# Patient Record
Sex: MALE | Race: WHITE | Employment: FULL TIME | URBAN - METROPOLITAN AREA
[De-identification: names, ages, dates, MRNs, and addresses within clinical notes are randomized per-mention and may not be internally consistent; named-entity substitution may affect disease eponyms.]

---

## 2017-01-29 ENCOUNTER — HOSPITAL ENCOUNTER (OUTPATIENT)
Dept: MRI IMAGING | Age: 47
Discharge: HOME | End: 2017-01-29
Attending: ORTHOPAEDIC SURGERY
Payer: COMMERCIAL

## 2017-01-29 DIAGNOSIS — M23.305: ICD-10-CM

## 2017-01-29 PROCEDURE — 73721 MRI JNT OF LWR EXTRE W/O DYE: CPT

## 2017-07-31 ENCOUNTER — OFFICE VISIT (OUTPATIENT)
Dept: INTERNAL MEDICINE CLINIC | Age: 47
End: 2017-07-31

## 2017-07-31 VITALS
OXYGEN SATURATION: 98 % | DIASTOLIC BLOOD PRESSURE: 69 MMHG | WEIGHT: 213 LBS | RESPIRATION RATE: 16 BRPM | HEART RATE: 63 BPM | SYSTOLIC BLOOD PRESSURE: 105 MMHG | TEMPERATURE: 97.8 F | BODY MASS INDEX: 30.49 KG/M2 | HEIGHT: 70 IN

## 2017-07-31 DIAGNOSIS — M25.561 RIGHT KNEE PAIN, UNSPECIFIED CHRONICITY: ICD-10-CM

## 2017-07-31 DIAGNOSIS — G50.0 SUPRAORBITAL NEURALGIA: ICD-10-CM

## 2017-07-31 DIAGNOSIS — F43.20 ADJUSTMENT DISORDER, UNSPECIFIED TYPE: ICD-10-CM

## 2017-07-31 DIAGNOSIS — R20.0 NUMBNESS OF FINGER: ICD-10-CM

## 2017-07-31 DIAGNOSIS — G47.00 INSOMNIA, UNSPECIFIED TYPE: ICD-10-CM

## 2017-07-31 DIAGNOSIS — Z76.89 ENCOUNTER TO ESTABLISH CARE WITH NEW DOCTOR: Primary | ICD-10-CM

## 2017-07-31 DIAGNOSIS — I10 ESSENTIAL HYPERTENSION: ICD-10-CM

## 2017-07-31 RX ORDER — MELOXICAM 15 MG/1
15 TABLET ORAL DAILY
Qty: 30 TAB | Refills: 5 | Status: SHIPPED | OUTPATIENT
Start: 2017-07-31 | End: 2017-11-27 | Stop reason: SDUPTHER

## 2017-07-31 RX ORDER — AMITRIPTYLINE HYDROCHLORIDE 25 MG/1
25 TABLET, FILM COATED ORAL
Qty: 30 TAB | Refills: 2 | Status: SHIPPED | OUTPATIENT
Start: 2017-07-31 | End: 2017-11-27 | Stop reason: ALTCHOICE

## 2017-07-31 RX ORDER — LISINOPRIL 5 MG/1
5 TABLET ORAL DAILY
Qty: 30 TAB | Refills: 3 | Status: SHIPPED | OUTPATIENT
Start: 2017-07-31 | End: 2017-11-27 | Stop reason: SDUPTHER

## 2017-07-31 RX ORDER — CLONAZEPAM 0.5 MG/1
0.5 TABLET ORAL
Qty: 15 TAB | Refills: 0 | Status: SHIPPED | OUTPATIENT
Start: 2017-07-31 | End: 2017-11-27 | Stop reason: SDUPTHER

## 2017-07-31 NOTE — PROGRESS NOTES
1. Have you been to the ER, urgent care clinic since your last visit? Hospitalized since your last visit? No    2. Have you seen or consulted any other health care providers outside of the [facility] since your last visit? Include any pap smears or colon screening.   Pt uses [facility].

## 2017-07-31 NOTE — MR AVS SNAPSHOT
Visit Information Date & Time Provider Department Dept. Phone Encounter #  
 7/31/2017 11:15 AM Leonor Wright MD [facility] [id number] Follow-up Instructions Return in about 1 month (around 8/31/2017). Your Appointments 8/28/2017  1:30 PM  
New Patient with Leighann Longoria MD  
[facility] [address] [place] [address] Appt Note: np est pcp c/p  
 Uriah Banerjee [address] [address]  
[phone]  
  
   
 [address] [facility] [address] Upcoming Health Maintenance Date Due DTaP/Tdap/Td series (1 - Tdap) 9/10/1990 INFLUENZA AGE 9 TO ADULT 8/1/2017 Allergies as of 7/31/2017  Review Complete On: 7/31/2017 By: Leonor Wright MD  
  
 Severity Noted Reaction Type Reactions Omnicef [Cefdinir]  02/19/2014    Hives Current Immunizations  Never Reviewed No immunizations on file. Not reviewed this visit You Were Diagnosed With   
  
 Codes Comments Encounter to establish care with new doctor    -  Primary ICD-10-CM: Z71.89 ICD-9-CM: V65.8 Adjustment disorder, unspecified type     ICD-10-CM: F43.20 ICD-9-CM: 309.9 Supraorbital neuralgia     ICD-10-CM: G52.9 ICD-9-CM: 352.9 Essential hypertension     ICD-10-CM: I10 
ICD-9-CM: 401.9 Insomnia, unspecified type     ICD-10-CM: G47.00 ICD-9-CM: 780.52 Numbness of finger     ICD-10-CM: R20.0 ICD-9-CM: 782.0 Right knee pain, unspecified chronicity     ICD-10-CM: M25.561 ICD-9-CM: 719.46 Vitals BP Pulse Temp Resp Height(growth percentile) Weight(growth percentile) 105/69 (BP 1 Location: Left arm, BP Patient Position: Sitting) 63 97.8 °F (36.6 °C) (Oral) 16 5' 10\" (1.778 m) 213 lb (96.6 kg) SpO2 BMI Smoking Status 98% 30.56 kg/m2 Never Smoker Vitals History BMI and BSA Data Body Mass Index Body Surface Area  30.56 kg/m 2 2.18 m 2  
  
  
 Preferred Pharmacy Pharmacy Name Phone [facility] Amadeo Par[facility] [place] [facility] Closs [phone] Your Updated Medication List  
  
   
This list is accurate as of: 7/31/17 11:35 AM.  Always use your most recent med list.  
  
  
  
  
 amitriptyline 25 mg tablet Commonly known as:  ELAVIL Take 1 Tab by mouth nightly. clonazePAM 0.5 mg tablet Commonly known as:  Raydell Pavel Take 1 Tab by mouth nightly as needed. Max Daily Amount: 0.5 mg.  
  
 lisinopril 5 mg tablet Commonly known as:  Sam Human Take 1 Tab by mouth daily. meloxicam 15 mg tablet Commonly known as:  MOBIC Take 1 Tab by mouth daily. Prescriptions Printed Refills  
 clonazePAM (KLONOPIN) 0.5 mg tablet 0 Sig: Take 1 Tab by mouth nightly as needed. Max Daily Amount: 0.5 mg.  
 Class: Print Route: Oral  
  
Prescriptions Sent to Pharmacy Refills  
 amitriptyline (ELAVIL) 25 mg tablet 2 Sig: Take 1 Tab by mouth nightly. Class: Normal  
 Pharm[facility] [address] [facility]: [phone] Route: Oral  
 lisinopril (PRINIVIL, ZESTRIL) 5 mg tablet 3 Sig: Take 1 Tab by mouth daily. Class: Normal  
 Pharmacy: [facility] [address] [place] [facility] Ph #: [phone] Route: Oral  
 meloxicam (MOBIC) 15 mg tablet 5 Sig: Take 1 Tab by mouth daily. Class: Normal  
 Pharmacy: [place] [address] Ph #: [phone] Route: Oral  
  
We Performed the Following CBC WITH AUTOMATED DIFF [31427 CPT(R)] LIPID PANEL [53624 CPT(R)] METABOLIC PANEL, COMPREHENSIVE [79906 CPT(R)] Follow-up Instructions Return in about 1 month (around 8/31/2017). Patient Instructions High Blood Pressure: Care Instructions Your Care Instructions If your blood pressure is usually above 140/90, you have high blood pressure, or hypertension. That means the top number is 140 or higher or the bottom number is 90 or higher, or both. Despite what a lot of people think, high blood pressure usually doesn't cause headaches or make you feel dizzy or lightheaded. It usually has no symptoms. But it does increase your risk for heart attack, stroke, and kidney or eye damage. The higher your blood pressure, the more your risk increases. Your doctor will give you a goal for your blood pressure. Your goal will be based on your health and your age. An example of a goal is to keep your blood pressure below 140/90. Lifestyle changes, such as eating healthy and being active, are always important to help lower blood pressure. You might also take medicine to reach your blood pressure goal. 
Follow-up care is a key part of your treatment and safety. Be sure to make and go to all appointments, and call your doctor if you are having problems. It's also a good idea to know your test results and keep a list of the medicines you take. How can you care for yourself at home? Medical treatment · If you stop taking your medicine, your blood pressure will go back up. You may take one or more types of medicine to lower your blood pressure. Be safe with medicines. Take your medicine exactly as prescribed. Call your doctor if you think you are having a problem with your medicine. · Talk to your doctor before you start taking aspirin every day. Aspirin can help certain people lower their risk of a heart attack or stroke. But taking aspirin isn't right for everyone, because it can cause serious bleeding. · See your doctor regularly. You may need to see the doctor more often at first or until your blood pressure comes down. · If you are taking blood pressure medicine, talk to your doctor before you take decongestants or anti-inflammatory medicine, such as ibuprofen. Some of these medicines can raise blood pressure. · Learn how to check your blood pressure at home. Lifestyle changes · Stay at a healthy weight. This is especially important if you put on weight around the waist. Losing even 10 pounds can help you lower your blood pressure. · If your doctor recommends it, get more exercise. Walking is a good choice. Bit by bit, increase the amount you walk every day. Try for at least 30 minutes on most days of the week. You also may want to swim, bike, or do other activities. · Avoid or limit alcohol. Talk to your doctor about whether you can drink any alcohol. · Try to limit how much sodium you eat to less than 2,300 milligrams (mg) a day. Your doctor may ask you to try to eat less than 1,500 mg a day. · Eat plenty of fruits (such as bananas and oranges), vegetables, legumes, whole grains, and low-fat dairy products. · Lower the amount of saturated fat in your diet. Saturated fat is found in animal products such as milk, cheese, and meat. Limiting these foods may help you lose weight and also lower your risk for heart disease. · Do not smoke. Smoking increases your risk for heart attack and stroke. If you need help quitting, talk to your doctor about stop-smoking programs and medicines. These can increase your chances of quitting for good. When should you call for help? Call 911 anytime you think you may need emergency care. This may mean having symptoms that suggest that your blood pressure is causing a serious heart or blood vessel problem. Your blood pressure may be over 180/110. For example, call 911 if: 
· You have symptoms of a heart attack. These may include: ¨ Chest pain or pressure, or a strange feeling in the chest. 
¨ Sweating. ¨ Shortness of breath. ¨ Nausea or vomiting. ¨ Pain, pressure, or a strange feeling in the back, neck, jaw, or upper belly or in one or both shoulders or arms. ¨ Lightheadedness or sudden weakness. ¨ A fast or irregular heartbeat. · You have symptoms of a stroke. These may include: 
¨ Sudden numbness, tingling, weakness, or loss of movement in your face, arm, or leg, especially on only one side of your body. ¨ Sudden vision changes. ¨ Sudden trouble speaking. ¨ Sudden confusion or trouble understanding simple statements. ¨ Sudden problems with walking or balance. ¨ A sudden, severe headache that is different from past headaches. · You have severe back or belly pain. Do not wait until your blood pressure comes down on its own. Get help right away. Call your doctor now or seek immediate care if: 
· Your blood pressure is much higher than normal (such as 180/110 or higher), but you don't have symptoms. · You think high blood pressure is causing symptoms, such as: ¨ Severe headache. ¨ Blurry vision. Watch closely for changes in your health, and be sure to contact your doctor if: 
· Your blood pressure measures 140/90 or higher at least 2 times. That means the top number is 140 or higher or the bottom number is 90 or higher, or both. · You think you may be having side effects from your blood pressure medicine. · Your blood pressure is usually normal, but it goes above normal at least 2 times. Where can you learn more? Go to http://jennifer-annabelle.info/. Enter Q479 in the search box to learn more about \"High Blood Pressure: Care Instructions. \" Current as of: August 8, 2016 Content Version: 11.3 © 5825-2385 [employer]. Care instructions adapted under license by [employer] (which disclaims liability or warranty for this information). If you have questions about a medical condition or this instruction, always ask your healthcare professional. [address] any warranty or liability for your use of this information. Introducing [facility]!    
 Darby Morris introduces [employer] patient portal. Now you can access parts of your medical record, email your doctor's office, and request medication refills online. 1. In your internet browser, go to [email or web address] 2. Click on the First Time User? Click Here link in the Sign In box. You will see the New Member Sign Up page. 3. Enter your Smarty Ring Access Code exactly as it appears below. You will not need to use this code after youve completed the sign-up process. If you do not sign up before the expiration date, you must request a new code. · Smart[employer] Ring Access Code: [email or web address] Expires: 10/29/2017 11:35 AM 
 
4. Enter the last four digits of your Social Security Number (xxxx) and Date of Birth (mm/dd/yyyy) as indicated and click Submit. You will be taken to the next sign-up page. 5. Create a Smarty Ring ID. This will be your Smarty Ring login ID and cannot be changed, so think of one that is secure and easy to remember. 6. Create a Smart[employer] Ring password. You can change your password at any time. 7. Enter your Password Reset Question and Answer. This can be used at a later time if you forget your password. 8. Enter your e-mail address. You will receive e-mail notification when new information is available in [address] 9. Click Sign Up. You can now view and download portions of your medical record. 10. Click the Download Summary menu link to download a portable copy of your medical information. If you have questions, please visit the Frequently Asked Questions section of the Smart[employer] Ring website. Remember, Smart[employer] Ring is NOT to be used for urgent needs. For medical emergencies, dial 911. Now available from your iPhone and Android! Please provide this summary of care documentation to your next provider. Your primary care clinician is listed as [address] If you have any questions after today's visit, please call [phone].

## 2017-07-31 NOTE — PATIENT INSTRUCTIONS

## 2017-07-31 NOTE — PROGRESS NOTES
Cesar Liang is a 51 y.o. male who presents today for Establish Care and Numbness (left little finger. No known trauma.)  . He has a history of   Patient Active Problem List   Diagnosis Code    Neck pain M54.2    Supraorbital neuralgia G52.9   . Today patient is here to establish care. [facility]. he is switching because establish care. Records are not available for me to review. he does have other concerns. His children go to [facility]    Mother with pancreatic cancer. Seeing Dr. Sergei Luna. Just was diagnosed over the last several weeks. Father with dementia and taking a lot of time. Has had a short temper. \"blowing up easily\". Having very high stress. Not sleeping very well and has not recently. Stress with recent move. Pt notes breaking down and crying last night. Good support system with wife and family. Brothers helping with parents. Work is stable. History of CP: in the past. Seeing Dr. Carmen Perez (cards) with [facility]. EKG normal. No stress. HTN: on two meds. Weight overall down. Pt notes some fatigue and wonders if this could be medication related. Not currently checking BP regularly. Supraorbital Neuralgia: In the past. No recent issues. Social:  for website. Not exercising as much as previously. No tobacco, EtOH social.     Family: HTN: parents. Mother: pancreatic cancer    Father: Borderline DM; Dementia. ROS  Review of Systems   Constitutional: Positive for malaise/fatigue. Negative for chills, fever and weight loss. HENT: Negative for congestion and sore throat. Eyes: Negative for blurred vision, double vision, photophobia and pain. Respiratory: Negative for cough and shortness of breath. Cardiovascular: Positive for chest pain (Had several in the past, none currently). Negative for palpitations, orthopnea, claudication and leg swelling.    Gastrointestinal: Negative for abdominal pain, constipation, diarrhea, heartburn, nausea and vomiting. Genitourinary: Negative for dysuria, frequency and urgency. Musculoskeletal: Positive for joint pain (R knee with use. ). Negative for myalgias. Skin: Negative for rash. Neurological: Negative. Negative for headaches. Endo/Heme/Allergies: Does not bruise/bleed easily. Psychiatric/Behavioral: Negative for depression, memory loss, substance abuse and suicidal ideas. The patient has insomnia. Very high stress levels       Visit Vitals    /69 (BP 1 Location: Left arm, BP Patient Position: Sitting)    Pulse 63    Temp 97.8 °F (36.6 °C) (Oral)    Resp 16    Ht 5' 10\" (1.778 m)    Wt 213 lb (96.6 kg)    SpO2 98%    BMI 30.56 kg/m2       Physical Exam   Constitutional: He is oriented to person, place, and time and well-developed, well-nourished, and in no distress. No distress. HENT:   Head: Normocephalic and atraumatic. Mouth/Throat: No oropharyngeal exudate. Eyes: Conjunctivae are normal. No scleral icterus. Neck: Normal range of motion. No JVD present. No thyromegaly present. Cardiovascular: Normal rate and regular rhythm. Exam reveals no gallop and no friction rub. No murmur heard. Pulmonary/Chest: Effort normal and breath sounds normal. No respiratory distress. He has no wheezes. Musculoskeletal: Normal range of motion. He exhibits no edema. Neurological: He is alert and oriented to person, place, and time. No cranial nerve deficit. Skin: Skin is dry. No rash noted. Psychiatric: Memory, affect and judgment normal.   Tearful at times. Appropriate for situation       Current Outpatient Prescriptions   Medication Sig    amitriptyline (ELAVIL) 25 mg tablet Take 1 Tab by mouth nightly.  clonazePAM (KLONOPIN) 0.5 mg tablet Take 1 Tab by mouth nightly as needed. Max Daily Amount: 0.5 mg.    lisinopril (PRINIVIL, ZESTRIL) 5 mg tablet Take 1 Tab by mouth daily.  meloxicam (MOBIC) 15 mg tablet Take 1 Tab by mouth daily. No current facility-administered medications for this visit. Past Medical History:   Diagnosis Date    Hypertension       Past Surgical History:   Procedure Laterality Date    HX MENISCUS REPAIR Left 2014    HX OTHER SURGICAL  2011    Rectal becker    HX TONSILLECTOMY  1975    HX WISDOM TEETH EXTRACTION  1988      Social History   Substance Use Topics    Smoking status: Never Smoker    Smokeless tobacco: Never Used    Alcohol use Yes      Comment: rare      Family History   Problem Relation Age of Onset    Heart Disease Mother     Cancer Mother     Heart Disease Father     Hypertension Father     Dementia Father     Cancer Maternal Grandfather         Allergies   Allergen Reactions    Omnicef [Cefdinir] Hives        Assessment/Plan  Diagnoses and all orders for this visit:    1. Encounter to establish care with new doctor - No significant other history. 2. Adjustment disorder, unspecified type - Acute stress. Sleep is big issue. Add nightly TCA to help with this. Discussed physical activity to help with stress. PRN benzo for acute stress. Not for long term use. No red flag symptoms. -     clonazePAM (KLONOPIN) 0.5 mg tablet; Take 1 Tab by mouth nightly as needed. Max Daily Amount: 0.5 mg.    3. Supraorbital neuralgia - previous issue    4. Essential hypertension - over controlled. Stop diuretic. Pt to monitor. Could titrate up ACEi. Screen for other modifyable risks. -     METABOLIC PANEL, COMPREHENSIVE  -     LIPID PANEL  -     CBC WITH AUTOMATED DIFF  -     lisinopril (PRINIVIL, ZESTRIL) 5 mg tablet; Take 1 Tab by mouth daily. 5. Insomnia, unspecified type  -     amitriptyline (ELAVIL) 25 mg tablet; Take 1 Tab by mouth nightly. -     clonazePAM (KLONOPIN) 0.5 mg tablet; Take 1 Tab by mouth nightly as needed. Max Daily Amount: 0.5 mg.    6. Numbness of finger - pinky. Likely entrapment. Monitor.      7. Right knee pain, unspecified chronicity  -     meloxicam (MOBIC) 15 mg tablet; Take 1 Tab by mouth daily. Follow-up Disposition:  Return in about 1 month (around 8/31/2017).     Sharan Roth MD  7/31/2017

## 2017-08-01 LAB
ALBUMIN SERPL-MCNC: 4.8 G/DL (ref 3.5–5.5)
ALBUMIN/GLOB SERPL: 2.3 {RATIO} (ref 1.2–2.2)
ALP SERPL-CCNC: 44 IU/L (ref 39–117)
ALT SERPL-CCNC: 22 IU/L (ref 0–44)
AST SERPL-CCNC: 20 IU/L (ref 0–40)
BASOPHILS # BLD AUTO: 0 X10E3/UL (ref 0–0.2)
BASOPHILS NFR BLD AUTO: 0 %
BILIRUB SERPL-MCNC: 0.6 MG/DL (ref 0–1.2)
BUN SERPL-MCNC: 14 MG/DL (ref 6–24)
BUN/CREAT SERPL: 13 (ref 9–20)
CALCIUM SERPL-MCNC: 10 MG/DL (ref 8.7–10.2)
CHLORIDE SERPL-SCNC: 100 MMOL/L (ref 96–106)
CHOLEST SERPL-MCNC: 205 MG/DL (ref 100–199)
CO2 SERPL-SCNC: 26 MMOL/L (ref 18–29)
CREAT SERPL-MCNC: 1.06 MG/DL (ref 0.76–1.27)
EOSINOPHIL # BLD AUTO: 0.1 X10E3/UL (ref 0–0.4)
EOSINOPHIL NFR BLD AUTO: 2 %
ERYTHROCYTE [DISTWIDTH] IN BLOOD BY AUTOMATED COUNT: 13.1 % (ref 12.3–15.4)
GLOBULIN SER CALC-MCNC: 2.1 G/DL (ref 1.5–4.5)
GLUCOSE SERPL-MCNC: 92 MG/DL (ref 65–99)
HCT VFR BLD AUTO: 50 % (ref 37.5–51)
HDLC SERPL-MCNC: 49 MG/DL
HGB BLD-MCNC: 17.4 G/DL (ref 12.6–17.7)
IMM GRANULOCYTES # BLD: 0 X10E3/UL (ref 0–0.1)
IMM GRANULOCYTES NFR BLD: 0 %
INTERPRETATION, 910389: NORMAL
LDLC SERPL CALC-MCNC: 133 MG/DL (ref 0–99)
LYMPHOCYTES # BLD AUTO: 1.6 X10E3/UL (ref 0.7–3.1)
LYMPHOCYTES NFR BLD AUTO: 32 %
MCH RBC QN AUTO: 33.7 PG (ref 26.6–33)
MCHC RBC AUTO-ENTMCNC: 34.8 G/DL (ref 31.5–35.7)
MCV RBC AUTO: 97 FL (ref 79–97)
MONOCYTES # BLD AUTO: 0.3 X10E3/UL (ref 0.1–0.9)
MONOCYTES NFR BLD AUTO: 7 %
NEUTROPHILS # BLD AUTO: 3 X10E3/UL (ref 1.4–7)
NEUTROPHILS NFR BLD AUTO: 59 %
PLATELET # BLD AUTO: 219 X10E3/UL (ref 150–379)
POTASSIUM SERPL-SCNC: 4.4 MMOL/L (ref 3.5–5.2)
PROT SERPL-MCNC: 6.9 G/DL (ref 6–8.5)
RBC # BLD AUTO: 5.16 X10E6/UL (ref 4.14–5.8)
SODIUM SERPL-SCNC: 139 MMOL/L (ref 134–144)
TRIGL SERPL-MCNC: 115 MG/DL (ref 0–149)
VLDLC SERPL CALC-MCNC: 23 MG/DL (ref 5–40)
WBC # BLD AUTO: 5.1 X10E3/UL (ref 3.4–10.8)

## 2017-11-27 ENCOUNTER — OFFICE VISIT (OUTPATIENT)
Dept: INTERNAL MEDICINE CLINIC | Age: 47
End: 2017-11-27

## 2017-11-27 VITALS
TEMPERATURE: 97.9 F | OXYGEN SATURATION: 96 % | SYSTOLIC BLOOD PRESSURE: 121 MMHG | HEIGHT: 70 IN | HEART RATE: 65 BPM | WEIGHT: 214 LBS | BODY MASS INDEX: 30.64 KG/M2 | DIASTOLIC BLOOD PRESSURE: 74 MMHG | RESPIRATION RATE: 16 BRPM

## 2017-11-27 DIAGNOSIS — G47.00 INSOMNIA, UNSPECIFIED TYPE: ICD-10-CM

## 2017-11-27 DIAGNOSIS — Z00.00 WELLNESS EXAMINATION: Primary | ICD-10-CM

## 2017-11-27 DIAGNOSIS — F43.20 ADJUSTMENT DISORDER, UNSPECIFIED TYPE: ICD-10-CM

## 2017-11-27 DIAGNOSIS — M25.561 RIGHT KNEE PAIN, UNSPECIFIED CHRONICITY: ICD-10-CM

## 2017-11-27 DIAGNOSIS — I10 ESSENTIAL HYPERTENSION: ICD-10-CM

## 2017-11-27 RX ORDER — MELATONIN 5 MG
5 CAPSULE ORAL
COMMUNITY
End: 2018-03-07 | Stop reason: ALTCHOICE

## 2017-11-27 RX ORDER — CLONAZEPAM 0.5 MG/1
0.5 TABLET ORAL
Qty: 15 TAB | Refills: 1 | Status: SHIPPED | OUTPATIENT
Start: 2017-11-27 | End: 2019-02-06

## 2017-11-27 RX ORDER — MELOXICAM 15 MG/1
15 TABLET ORAL DAILY
Qty: 30 TAB | Refills: 5 | Status: SHIPPED | OUTPATIENT
Start: 2017-11-27 | End: 2019-02-06 | Stop reason: SDUPTHER

## 2017-11-27 RX ORDER — LISINOPRIL 5 MG/1
5 TABLET ORAL DAILY
Qty: 30 TAB | Refills: 11 | Status: SHIPPED | OUTPATIENT
Start: 2017-11-27 | End: 2018-03-07 | Stop reason: SDUPTHER

## 2017-11-27 RX ORDER — ESCITALOPRAM OXALATE 5 MG/1
5 TABLET ORAL DAILY
Qty: 30 TAB | Refills: 5 | Status: SHIPPED | OUTPATIENT
Start: 2017-11-27 | End: 2018-03-07 | Stop reason: SDUPTHER

## 2017-11-27 RX ORDER — CLONAZEPAM 0.5 MG/1
0.5 TABLET ORAL
Qty: 15 TAB | Refills: 1 | Status: SHIPPED | OUTPATIENT
Start: 2017-11-27 | End: 2017-11-27 | Stop reason: SDUPTHER

## 2017-11-27 NOTE — PROGRESS NOTES
Sahil Euceda is a 49 y.o. male who presents today for Complete Physical and Depression  . He has a history of   Patient Active Problem List   Diagnosis Code    Neck pain M54.2    Supraorbital neuralgia G52.9   . Today patient is here for CPE. he does have other concerns. Stress/Loss of family Member: Notes that overall he is doing alright. Notes that his father has dementia and not doing well. Pt's father still living alone and dementia has worsened with his depression over loosing his wife. Mr. Dangelo Borges notes that he has been recently having more issues as this is the holidays and his mother really enjoyed this. Crying spells. Unable to sleep. No anger spells. Did have grogginess with TCA. Having to help father a lot due to his dementia. Hypertension - Only taking ACEi. Stopped BB at last visit. Hypertension ROS: taking medications as instructed, no medication side effects noted, no TIA's, no chest pain on exertion, no dyspnea on exertion, no swelling of ankles     reports that he has never smoked. He has never used smokeless tobacco.    reports that he drinks alcohol. BP Readings from Last 2 Encounters:   11/27/17 121/74   07/31/17 105/69       Health maintenance hx includes:  Exercise: moderately active. Form of exercise: running on occasions, riding his bike more. Diet: generally follows a low fat low cholesterol diet, nonsmoker, alcohol intake socially  Social:  for website. At home with wife and children. Cancer screening:    Colon cancer screening:  Last Colonoscopy: N/A. Prostate cancer screening: PSA and/or GABRIEL: N/A, father with recent issues with prostate. Immunizations: There is no immunization history on file for this patient. Immunization status: UTD. ROS  Review of Systems   Constitutional: Negative for chills, fever and weight loss. Eyes: Negative for blurred vision, double vision, photophobia and pain.    Respiratory: Negative for cough, hemoptysis, sputum production and shortness of breath. Cardiovascular: Negative for chest pain, palpitations, orthopnea, claudication and leg swelling. Gastrointestinal: Negative for abdominal pain, constipation, diarrhea, heartburn, nausea and vomiting. Genitourinary: Negative for dysuria, frequency, hematuria and urgency. Musculoskeletal: Positive for joint pain. Skin: Negative for itching and rash. Neurological: Negative. Endo/Heme/Allergies: Does not bruise/bleed easily. Psychiatric/Behavioral: Positive for depression. Negative for hallucinations, memory loss, substance abuse and suicidal ideas. The patient has insomnia. The patient is not nervous/anxious. Visit Vitals    /74 (BP 1 Location: Left arm, BP Patient Position: Sitting)    Pulse 65    Temp 97.9 °F (36.6 °C) (Oral)    Resp 16    Ht 5' 10\" (1.778 m)    Wt 214 lb (97.1 kg)    SpO2 96%    BMI 30.71 kg/m2       Physical Exam   Constitutional: He is oriented to person, place, and time. He appears well-developed and well-nourished. HENT:   Head: Normocephalic and atraumatic. Eyes: EOM are normal. Pupils are equal, round, and reactive to light. Neck: Normal range of motion. Neck supple. No JVD present. Cardiovascular: Normal rate and regular rhythm. No murmur heard. Pulmonary/Chest: Effort normal and breath sounds normal. No respiratory distress. He has no wheezes. Abdominal: Soft. Bowel sounds are normal. He exhibits no distension. There is no tenderness. Musculoskeletal: Normal range of motion. He exhibits no edema. Neurological: He is alert and oriented to person, place, and time. No cranial nerve deficit. Skin: Skin is warm and dry. He is not diaphoretic. Psychiatric: His behavior is normal.   Low mood. Tearful at times. Current Outpatient Prescriptions   Medication Sig    melatonin 5 mg cap capsule Take 5 mg by mouth nightly.     escitalopram oxalate (LEXAPRO) 5 mg tablet Take 1 Tab by mouth daily.  lisinopril (PRINIVIL, ZESTRIL) 5 mg tablet Take 1 Tab by mouth daily.  meloxicam (MOBIC) 15 mg tablet Take 1 Tab by mouth daily.  clonazePAM (KLONOPIN) 0.5 mg tablet Take 1 Tab by mouth nightly as needed. Max Daily Amount: 0.5 mg. No current facility-administered medications for this visit. Past Medical History:   Diagnosis Date    Hypertension       Past Surgical History:   Procedure Laterality Date    HX MENISCUS REPAIR Left 2014    HX OTHER SURGICAL  2011    Rectal becker    HX TONSILLECTOMY  1975    HX WISDOM TEETH EXTRACTION  1988      Social History   Substance Use Topics    Smoking status: Never Smoker    Smokeless tobacco: Never Used    Alcohol use Yes      Comment: rare      Family History   Problem Relation Age of Onset    Heart Disease Mother     Cancer Mother     Heart Disease Father     Hypertension Father     Dementia Father     Cancer Maternal Grandfather         Allergies   Allergen Reactions    Omnicef [Cefdinir] Hives        Assessment/Plan  Diagnoses and all orders for this visit:    1. Wellness examination -  UTD. Pt to continue to increase physical activity. Mignon Yancey was counseled on age-appropriate/ guideline-based risk prevention behaviors and screening for a 49y.o. year old   male . We also discussed adjustments in screening based on family history if necessary. Printed instructions for preventative screening guidelines and healthy behaviors given to patient with after visit summary. 2. Adjustment disorder, unspecified type - given continued symptoms, start low dose SSRI. PRN benzo, but discussed not for long term use. -     escitalopram oxalate (LEXAPRO) 5 mg tablet; Take 1 Tab by mouth daily. -     clonazePAM (KLONOPIN) 0.5 mg tablet; Take 1 Tab by mouth nightly as needed. Max Daily Amount: 0.5 mg.    3. Insomnia, unspecified type  -     clonazePAM (KLONOPIN) 0.5 mg tablet;  Take 1 Tab by mouth nightly as needed. Max Daily Amount: 0.5 mg.    4. Essential hypertension - much better. Only on low dose ACEi. -     lisinopril (PRINIVIL, ZESTRIL) 5 mg tablet; Take 1 Tab by mouth daily. 5. Right knee pain, unspecified chronicity - PRN use.   -     meloxicam (MOBIC) 15 mg tablet; Take 1 Tab by mouth daily. Follow-up Disposition:  Return in about 3 months (around 2/27/2018).     Kelli Cervantes MD  11/27/2017

## 2017-11-27 NOTE — PATIENT INSTRUCTIONS
Escitalopram (By mouth)   Escitalopram ([id number])  Treats depression and generalized anxiety disorder ([place]). Brand Name(s): Lexapro   There may be other brand names for this medicine. When This Medicine Should Not Be Used: This medicine is not right for everyone. Do not use it if you had an allergic reaction to escitalopram or citalopram.  How to Use This Medicine:   Liquid, Tablet  · Take this medicine as directed. You may need to take it for a month or more before you feel better. Your dose may need to be changed to find out what works best for you. · Measure the oral liquid medicine with a marked measuring spoon, oral syringe, or medicine cup. · This medicine should come with a Medication Guide. Ask your pharmacist for a copy if you do not have one. · Missed dose: Take a dose as soon as you remember. If it is almost time for your next dose, wait until then and take a regular dose. Do not take extra medicine to make up for a missed dose. · Store the medicine in a closed container at room temperature, away from heat, moisture, and direct light. Drugs and Foods to Avoid:   Ask your doctor or pharmacist before using any other medicine, including over-the-counter medicines, vitamins, and herbal products. · Do not use this medicine together with pimozide. Do not use this medicine and an MAO inhibitor (MAOI) within 14 days of each other. · Some medicines can affect how escitalopram works. Tell your doctor if you are using the following:   ¨ Buspirone, carbamazepine, fentanyl, lithium, Ricardo's wort, tramadol, or tryptophan supplements  ¨ Amphetamines  ¨ Blood thinner (including warfarin)  ¨ Diuretic (water pill)  ¨ NSAID pain or arthritis medicine (including aspirin, celecoxib, diclofenac, ibuprofen, naproxen)  ¨ Triptan medicine to treat migraine headaches (including sumatriptan)  · Tell your doctor if you use anything else that makes you sleepy.  Some examples are allergy medicine, narcotic pain medicine, and alcohol. · Do not drink alcohol while you are using this medicine. Warnings While Using This Medicine:   · Tell your doctor if you are pregnant or breastfeeding, or if you have kidney disease, liver disease, bleeding problems, glaucoma, heart disease, or a seizure disorder. · For some children, teenagers, and young adults, this medicine may increase mental or emotional problems. This may lead to thoughts of suicide and violence. Talk with your doctor right away if you have any thoughts or behavior changes that concern you. Tell your doctor if you or anyone in your family has a history of bipolar disorder or suicide attempts. · This medicine may cause the following problems:   ¨ Serotonin syndrome (more likely when taken with certain medicines)  ¨ Low sodium levels  ¨ Increased risk of bleeding problems  · This medicine may make you dizzy or drowsy. Do not drive or do anything that could be dangerous until you know how this medicine affects you. · Your doctor may want to monitor your child's weight and height, because this medicine may cause decreased appetite and weight loss in children. · Do not stop using this medicine suddenly. Your doctor will need to slowly decrease your dose before you stop it completely. · Your doctor will check your progress and the effects of this medicine at regular visits. Keep all appointments. · Keep all medicine out of the reach of children. Never share your medicine with anyone.   Possible Side Effects While Using This Medicine:   Call your doctor right away if you notice any of these side effects:  · Allergic reaction: Itching or hives, swelling in your face or hands, swelling or tingling in your mouth or throat, chest tightness, trouble breathing  · Anxiety, restlessness, fever, sweating, muscle spasms, nausea, vomiting, diarrhea, seeing or hearing things that are not there  · Confusion, weakness, and muscle twitching  · Eye pain, vision changes, seeing halos around lights  · Fast, pounding, or uneven heartbeat  · Feeling more excited or energetic than usual, racing thoughts, trouble sleeping  · Seizures  · Thoughts of hurting yourself or others, unusual behavior  · Unusual bleeding or bruising  If you notice these less serious side effects, talk with your doctor:   · Dizziness, drowsiness, or sleepiness  · Dry mouth  · Headache  · Nausea, constipation, diarrhea  · Sexual problems  If you notice other side effects that you think are caused by this medicine, tell your doctor. Call your doctor for medical advice about side effects. You may report side effects to FDA at 4-005-JDK-8506  © 2017 [facility] Information is for End User's use only and may not be sold, redistributed or otherwise used for commercial purposes. The above information is an  only. It is not intended as medical advice for individual conditions or treatments. Talk to your doctor, nurse or pharmacist before following any medical regimen to see if it is safe and effective for you. Well Visit, Ages 25 to 48: Care Instructions  Your Care Instructions    Physical exams can help you stay healthy. Your doctor has checked your overall health and may have suggested ways to take good care of yourself. He or she also may have recommended tests. At home, you can help prevent illness with healthy eating, regular exercise, and other steps. Follow-up care is a key part of your treatment and safety. Be sure to make and go to all appointments, and call your doctor if you are having problems. It's also a good idea to know your test results and keep a list of the medicines you take. How can you care for yourself at home? · Reach and stay at a healthy weight. This will lower your risk for many problems, such as obesity, diabetes, heart disease, and high blood pressure. · Get at least 30 minutes of physical activity on most days of the week. Walking is a good choice.  You also may want to do other activities, such as running, swimming, cycling, or playing tennis or team sports. Discuss any changes in your exercise program with your doctor. · Do not smoke or allow others to smoke around you. If you need help quitting, talk to your doctor about stop-smoking programs and medicines. These can increase your chances of quitting for good. · Talk to your doctor about whether you have any risk factors for sexually transmitted infections (STIs). Having one sex partner (who does not have STIs and does not have sex with anyone else) is a good way to avoid these infections. · Use birth control if you do not want to have children at this time. Talk with your doctor about the choices available and what might be best for you. · Protect your skin from too much sun. When you're outdoors from 10 a.m. to 4 p.m., stay in the shade or cover up with clothing and a hat with a wide brim. Wear sunglasses that block UV rays. Even when it's cloudy, put broad-spectrum sunscreen (SPF 30 or higher) on any exposed skin. · See a dentist one or two times a year for checkups and to have your teeth cleaned. · Wear a seat belt in the car. · Drink alcohol in moderation, if at all. That means no more than 2 drinks a day for men and 1 drink a day for women. Follow your doctor's advice about when to have certain tests. These tests can spot problems early. For everyone  · Cholesterol. Have the fat (cholesterol) in your blood tested after age 21. Your doctor will tell you how often to have this done based on your age, family history, or other things that can increase your risk for heart disease. · Blood pressure. Have your blood pressure checked during a routine doctor visit. Your doctor will tell you how often to check your blood pressure based on your age, your blood pressure results, and other factors. · Vision. Talk with your doctor about how often to have a glaucoma test.  · Diabetes.  Ask your doctor whether you should have tests for diabetes. · Colon cancer. Have a test for colon cancer at age 48. You may have one of several tests. If you are younger than 48, you may need a test earlier if you have any risk factors. Risk factors include whether you already had a precancerous polyp removed from your colon or whether your parent, brother, sister, or child has had colon cancer. For women  · Breast exam and mammogram. Talk to your doctor about when you should have a clinical breast exam and a mammogram. Medical experts differ on whether and how often women under 50 should have these tests. Your doctor can help you decide what is right for you. · Pap test and pelvic exam. Begin Pap tests at age 24. A Pap test is the best way to find cervical cancer. The test often is part of a pelvic exam. Ask how often to have this test.  · Tests for sexually transmitted infections (STIs). Ask whether you should have tests for STIs. You may be at risk if you have sex with more than one person, especially if your partners do not wear condoms. For men  · Tests for sexually transmitted infections (STIs). Ask whether you should have tests for STIs. You may be at risk if you have sex with more than one person, especially if you do not wear a condom. · Testicular cancer exam. Ask your doctor whether you should check your testicles regularly. · Prostate exam. Talk to your doctor about whether you should have a blood test (called a PSA test) for prostate cancer. Experts differ on whether and when men should have this test. Some experts suggest it if you are older than 39 and are -American or have a father or brother who got prostate cancer when he was younger than 72. When should you call for help? Watch closely for changes in your health, and be sure to contact your doctor if you have any problems or symptoms that concern you. Where can you learn more? Go to http://jennifer-annabelle.info/.   Enter P072 in the search box to learn more about \"Well Visit, Ages 25 to 48: Care Instructions. \"  Current as of: May 12, 2017  Content Version: 11.4  © 9800-2391 [employer]. Care instructions adapted under license by [employer] (which disclaims liability or warranty for this information). If you have questions about a medical condition or this instruction, always ask your healthcare professional. [place] [address] any warranty or liability for your use of this information.

## 2017-11-27 NOTE — MR AVS SNAPSHOT
Visit Information Date & Time Provider Department Dept. Phone Encounter #  
 11/27/2017  1:30 PM Chon Carmichael MD [facility] [address] [id number] Upcoming Health Maintenance Date Due DTaP/Tdap/Td series (2 - Td) 11/19/2023 Allergies as of 11/27/2017  Review Complete On: 99/99/0123 By: Oanh Ferrer Severity Noted Reaction Type Reactions Omnicef [Cefdinir]  02/19/2014    Hives Current Immunizations  Never Reviewed No immunizations on file. Not reviewed this visit You Were Diagnosed With   
  
 Codes Comments Wellness examination    -  Primary ICD-10-CM: Z00.00 ICD-9-CM: V70.0 Adjustment disorder, unspecified type     ICD-10-CM: F43.20 ICD-9-CM: 309.9 Insomnia, unspecified type     ICD-10-CM: G47.00 ICD-9-CM: 780.52 Essential hypertension     ICD-10-CM: I10 
ICD-9-CM: 401.9 Right knee pain, unspecified chronicity     ICD-10-CM: M25.561 ICD-9-CM: 719.46 Vitals BP Pulse Temp Resp Height(growth percentile) Weight(growth percentile) 121/74 (BP 1 Location: Left arm, BP Patient Position: Sitting) 65 97.9 °F (36.6 °C) (Oral) 16 5' 10\" (1.778 m) 214 lb (97.1 kg) SpO2 BMI Smoking Status 96% 30.71 kg/m2 Never Smoker Vitals History BMI and BSA Data Body Mass Index Body Surface Area 30.71 kg/m 2 2.19 m 2 Preferred Pharmacy Pharmacy Name Phone [facility] [address] [place] [address] Lisa Tam [facility] [phone] Your Updated Medication List  
  
   
This list is accurate as of: 11/27/17  2:38 PM.  Always use your most recent med list.  
  
  
  
  
 clonazePAM 0.5 mg tablet Commonly known as:  Weesatche Boop Take 1 Tab by mouth nightly as needed. Max Daily Amount: 0.5 mg.  
  
 escitalopram oxalate 5 mg tablet Commonly known as:  Eugena Lindy Take 1 Tab by mouth daily. lisinopril 5 mg tablet Commonly known as:  Donnald Code  
 Take 1 Tab by mouth daily. melatonin 5 mg Cap capsule Take 5 mg by mouth nightly. meloxicam 15 mg tablet Commonly known as:  MOBIC Take 1 Tab by mouth daily. Prescriptions Printed Refills  
 clonazePAM (KLONOPIN) 0.5 mg tablet 1 Sig: Take 1 Tab by mouth nightly as needed. Max Daily Amount: 0.5 mg.  
 Class: Print Route: Oral  
  
Prescriptions Sent to Pharmacy Refills  
 escitalopram oxalate (LEXAPRO) 5 mg tablet 5 Sig: Take 1 Tab by mouth daily. Class: Normal  
 Pharmacy: [place] [address] Ph #: [phone] Route: Oral  
 lisinopril (PRINIVIL, ZESTRIL) 5 mg tablet 11 Sig: Take 1 Tab by mouth daily. Class: Normal  
 Pharmacy: [place] [address] Ph #: [phone] Route: Oral  
 meloxicam (MOBIC) 15 mg tablet 5 Sig: Take 1 Tab by mouth daily. Class: Normal  
 [place] [address] [place] [address] Ph #: [phone] Route: Oral  
  
Patient Instructions Escitalopram (By mouth) Escitalopram ([id number]-mzd-JMU-oh-pram) Treats depression and generalized anxiety disorder (DENNIS). Brand Name(s): Lexapro There may be other brand names for this medicine. When This Medicine Should Not Be Used: This medicine is not right for everyone. Do not use it if you had an allergic reaction to escitalopram or citalopram. 
How to Use This Medicine:  
Liquid, Tablet · Take this medicine as directed. You may need to take it for a month or more before you feel better. Your dose may need to be changed to find out what works best for you. · Measure the oral liquid medicine with a marked measuring spoon, oral syringe, or medicine cup. · This medicine should come with a Medication Guide. Ask your pharmacist for a copy if you do not have one. · Missed dose: Take a dose as soon as you remember.  If it is almost time for your next dose, wait until then and take a regular dose. Do not take extra medicine to make up for a missed dose. · Store the medicine in a closed container at room temperature, away from heat, moisture, and direct light. Drugs and Foods to Avoid: Ask your doctor or pharmacist before using any other medicine, including over-the-counter medicines, vitamins, and herbal products. · Do not use this medicine together with pimozide. Do not use this medicine and an MAO inhibitor (MAOI) within 14 days of each other. · Some medicines can affect how escitalopram works. Tell your doctor if you are using the following:  
¨ Buspirone, carbamazepine, fentanyl, lithium, Ricardo's wort, tramadol, or tryptophan supplements ¨ Amphetamines ¨ Blood thinner (including warfarin) ¨ Diuretic (water pill) ¨ NSAID pain or arthritis medicine (including aspirin, celecoxib, diclofenac, ibuprofen, naproxen) ¨ Triptan medicine to treat migraine headaches (including sumatriptan) · Tell your doctor if you use anything else that makes you sleepy. Some examples are allergy medicine, narcotic pain medicine, and alcohol. · Do not drink alcohol while you are using this medicine. Warnings While Using This Medicine: · Tell your doctor if you are pregnant or breastfeeding, or if you have kidney disease, liver disease, bleeding problems, glaucoma, heart disease, or a seizure disorder. · For some children, teenagers, and young adults, this medicine may increase mental or emotional problems. This may lead to thoughts of suicide and violence. Talk with your doctor right away if you have any thoughts or behavior changes that concern you. Tell your doctor if you or anyone in your family has a history of bipolar disorder or suicide attempts. · This medicine may cause the following problems:  
¨ Serotonin syndrome (more likely when taken with certain medicines) ¨ Low sodium levels ¨ Increased risk of bleeding problems · This medicine may make you dizzy or drowsy. Do not drive or do anything that could be dangerous until you know how this medicine affects you. · Your doctor may want to monitor your child's weight and height, because this medicine may cause decreased appetite and weight loss in children. · Do not stop using this medicine suddenly. Your doctor will need to slowly decrease your dose before you stop it completely. · Your doctor will check your progress and the effects of this medicine at regular visits. Keep all appointments. · Keep all medicine out of the reach of children. Never share your medicine with anyone. Possible Side Effects While Using This Medicine:  
Call your doctor right away if you notice any of these side effects: · Allergic reaction: Itching or hives, swelling in your face or hands, swelling or tingling in your mouth or throat, chest tightness, trouble breathing · Anxiety, restlessness, fever, sweating, muscle spasms, nausea, vomiting, diarrhea, seeing or hearing things that are not there · Confusion, weakness, and muscle twitching · Eye pain, vision changes, seeing halos around lights · Fast, pounding, or uneven heartbeat · Feeling more excited or energetic than usual, racing thoughts, trouble sleeping · Seizures · Thoughts of hurting yourself or others, unusual behavior · Unusual bleeding or bruising If you notice these less serious side effects, talk with your doctor: · Dizziness, drowsiness, or sleepiness · Dry mouth 
· Headache · Nausea, constipation, diarrhea · Sexual problems If you notice other side effects that you think are caused by this medicine, tell your doctor. Call your doctor for medical advice about side effects. You may report side effects to FDA at 6-518-FDA-1067 © 2017 [address] Information is for End User's use only and may not be sold, redistributed or otherwise used for commercial purposes. The above information is an  only. It is not intended as medical advice for individual conditions or treatments. Talk to your doctor, nurse or pharmacist before following any medical regimen to see if it is safe and effective for you. Well Visit, Ages 25 to 48: Care Instructions Your Care Instructions Physical exams can help you stay healthy. Your doctor has checked your overall health and may have suggested ways to take good care of yourself. He or she also may have recommended tests. At home, you can help prevent illness with healthy eating, regular exercise, and other steps. Follow-up care is a key part of your treatment and safety. Be sure to make and go to all appointments, and call your doctor if you are having problems. It's also a good idea to know your test results and keep a list of the medicines you take. How can you care for yourself at home? · Reach and stay at a healthy weight. This will lower your risk for many problems, such as obesity, diabetes, heart disease, and high blood pressure. · Get at least 30 minutes of physical activity on most days of the week. Walking is a good choice. You also may want to do other activities, such as running, swimming, cycling, or playing tennis or team sports. Discuss any changes in your exercise program with your doctor. · Do not smoke or allow others to smoke around you. If you need help quitting, talk to your doctor about stop-smoking programs and medicines. These can increase your chances of quitting for good. · Talk to your doctor about whether you have any risk factors for sexually transmitted infections (STIs). Having one sex partner (who does not have STIs and does not have sex with anyone else) is a good way to avoid these infections. · Use birth control if you do not want to have children at this time. Talk with your doctor about the choices available and what might be best for you. · Protect your skin from too much sun. When you're outdoors from 10 a.m. to 4 p.m., stay in the shade or cover up with clothing and a hat with a wide brim. Wear sunglasses that block UV rays. Even when it's cloudy, put broad-spectrum sunscreen (SPF 30 or higher) on any exposed skin. · See a dentist one or two times a year for checkups and to have your teeth cleaned. · Wear a seat belt in the car. · Drink alcohol in moderation, if at all. That means no more than 2 drinks a day for men and 1 drink a day for women. Follow your doctor's advice about when to have certain tests. These tests can spot problems early. For everyone · Cholesterol. Have the fat (cholesterol) in your blood tested after age 21. Your doctor will tell you how often to have this done based on your age, family history, or other things that can increase your risk for heart disease. · Blood pressure. Have your blood pressure checked during a routine doctor visit. Your doctor will tell you how often to check your blood pressure based on your age, your blood pressure results, and other factors. · Vision. Talk with your doctor about how often to have a glaucoma test. 
· Diabetes. Ask your doctor whether you should have tests for diabetes. · Colon cancer. Have a test for colon cancer at age 48. You may have one of several tests. If you are younger than 48, you may need a test earlier if you have any risk factors. Risk factors include whether you already had a precancerous polyp removed from your colon or whether your parent, brother, sister, or child has had colon cancer. For women · Breast exam and mammogram. Talk to your doctor about when you should have a clinical breast exam and a mammogram. Medical experts differ on whether and how often women under 50 should have these tests. Your doctor can help you decide what is right for you. · Pap test and pelvic exam. Begin Pap tests at age 24.  A Pap test is the best way to find cervical cancer. The test often is part of a pelvic exam. Ask how often to have this test. 
· Tests for sexually transmitted infections (STIs). Ask whether you should have tests for STIs. You may be at risk if you have sex with more than one person, especially if your partners do not wear condoms. For men · Tests for sexually transmitted infections (STIs). Ask whether you should have tests for STIs. You may be at risk if you have sex with more than one person, especially if you do not wear a condom. · Testicular cancer exam. Ask your doctor whether you should check your testicles regularly. · Prostate exam. Talk to your doctor about whether you should have a blood test (called a PSA test) for prostate cancer. Experts differ on whether and when men should have this test. Some experts suggest it if you are older than 39 and are -American or have a father or brother who got prostate cancer when he was younger than 72. When should you call for help? Watch closely for changes in your health, and be sure to contact your doctor if you have any problems or symptoms that concern you. Where can you learn more? Go to http://jennifer-annabelle.info/. Enter P072 in the search box to learn more about \"Well Visit, Ages 25 to 48: Care Instructions. \" Current as of: May 12, 2017 Content Version: 11.4 © 0511-7574 [employer]wise, [employer]orporated. Care instructions adapted under license by [employer] (which disclaims liability or warranty for this information). If you have questions about a medical condition or this instruction, always ask your healthcare professional. [address] any warranty or liability for your use of this information. Introducing [facility]! Matthew Mendes introduces [employer] patient portal. Now you can access parts of your medical record, email your doctor's office, and request medication refills online. 1. In your internet browser, go to [email or web address] 2. Click on the First Time User? Click Here link in the Sign In box. You will see the New Member Sign Up page. 3. Enter your [employer] Access Code exactly as it appears below. You will not need to use this code after youve completed the sign-up process. If you do not sign up before the expiration date, you must request a new code. · [employer] Access Code: [email or web address] Expires: 2/25/2018  2:38 PM 
 
4. Enter the last four digits of your Social Security Number (xxxx) and Date of Birth (mm/dd/yyyy) as indicated and click Submit. You will be taken to the next sign-up page. 5. Create a [employer] ID. This will be your [employer] login ID and cannot be changed, so think of one that is secure and easy to remember. 6. Create a [employer] password. You can change your password at any time. 7. Enter your Password Reset Question and Answer. This can be used at a later time if you forget your password. 8. Enter your e-mail address. You will receive e-mail notification when new information is available in [address] 9. Click Sign Up. You can now view and download portions of your medical record. 10. Click the Download Summary menu link to download a portable copy of your medical information. If you have questions, please visit the Frequently Asked Questions section of the [employer] website. Remember, [employer] is NOT to be used for urgent needs. For medical emergencies, dial 911. Now available from your iPhone and Android! Please provide this summary of care documentation to your next provider. Your primary care clinician is listed as Chu Solorio. If you have any questions after today's visit, please call [phone].

## 2018-03-07 ENCOUNTER — OFFICE VISIT (OUTPATIENT)
Dept: INTERNAL MEDICINE CLINIC | Age: 48
End: 2018-03-07

## 2018-03-07 VITALS
BODY MASS INDEX: 30.35 KG/M2 | SYSTOLIC BLOOD PRESSURE: 122 MMHG | TEMPERATURE: 98 F | HEIGHT: 70 IN | DIASTOLIC BLOOD PRESSURE: 77 MMHG | OXYGEN SATURATION: 95 % | WEIGHT: 212 LBS | HEART RATE: 67 BPM | RESPIRATION RATE: 16 BRPM

## 2018-03-07 DIAGNOSIS — F43.20 ADJUSTMENT DISORDER, UNSPECIFIED TYPE: Primary | ICD-10-CM

## 2018-03-07 DIAGNOSIS — I10 ESSENTIAL HYPERTENSION: ICD-10-CM

## 2018-03-07 RX ORDER — LISINOPRIL 5 MG/1
5 TABLET ORAL DAILY
Qty: 90 TAB | Refills: 3 | Status: SHIPPED | OUTPATIENT
Start: 2018-03-07 | End: 2019-03-28 | Stop reason: SDUPTHER

## 2018-03-07 RX ORDER — ESCITALOPRAM OXALATE 5 MG/1
5 TABLET ORAL DAILY
Qty: 90 TAB | Refills: 2 | Status: SHIPPED | OUTPATIENT
Start: 2018-03-07 | End: 2018-12-10 | Stop reason: SDUPTHER

## 2018-03-07 NOTE — PROGRESS NOTES
Jerica Willams is a 49 y.o. male who presents today for Medication Refill and Depression  . 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 Today patient is here for f/u. Stress/Loss of family Member: At last visit he had recently lost his mother. He noted that overall he was doing alright. Notes that his father has dementia and was not doing well. Pt's father still living alone and dementia has worsened with his depression over loosing his wife. We decided to start SSRI due to continued symptoms. Since he notes changes in his sadness. Improved symptoms. Notes one 'meltdown' with his mood, but overall improved quicker. His father seems to be getting better. Requiring less help. Sleep still not great, but not a big issue. Hypertension - stable just on low dose BB   Hypertension ROS: taking medications as instructed, no medication side effects noted, no TIA's, no chest pain on exertion, no dyspnea on exertion, no swelling of ankles     reports that he has never smoked. He has never used smokeless tobacco.    reports that he drinks alcohol. BP Readings from Last 2 Encounters:   03/07/18 122/77   11/27/17 121/74          ROS  Review of Systems   Constitutional: Negative for chills, fever and weight loss. HENT: Negative for congestion, ear discharge, ear pain, hearing loss, nosebleeds and tinnitus. Respiratory: Negative for cough, hemoptysis, sputum production and shortness of breath. Cardiovascular: Negative for chest pain, palpitations, orthopnea, claudication and leg swelling. Gastrointestinal: Negative for abdominal pain, constipation, diarrhea, heartburn, nausea and vomiting. Genitourinary: Negative for dysuria, frequency, hematuria and urgency. Musculoskeletal: Positive for back pain. Negative for joint pain, myalgias and neck pain. Skin: Negative for itching and rash. Neurological: Negative. Endo/Heme/Allergies: Does not bruise/bleed easily. Psychiatric/Behavioral: Positive for depression. Negative for hallucinations, memory loss, substance abuse and suicidal ideas. The patient is not nervous/anxious and does not have insomnia. Visit Vitals    /77 (BP 1 Location: Left arm, BP Patient Position: Sitting)    Pulse 67    Temp 98 °F (36.7 °C) (Oral)    Resp 16    Ht 5' 10\" (1.778 m)    Wt 212 lb (96.2 kg)    SpO2 95%    BMI 30.42 kg/m2       Physical Exam   Constitutional: He is oriented to person, place, and time. He appears well-developed and well-nourished. HENT:   Head: Normocephalic and atraumatic. Eyes: EOM are normal. Pupils are equal, round, and reactive to light. Cardiovascular: Normal rate and regular rhythm. No murmur heard. Pulmonary/Chest: Effort normal and breath sounds normal. No respiratory distress. Genitourinary:   Genitourinary Comments: Small cyst on scrotum, No s/s of infection. Musculoskeletal: Normal range of motion. He exhibits no edema. Neurological: He is alert and oriented to person, place, and time. Skin: Skin is warm and dry. He is not diaphoretic. Psychiatric: He has a normal mood and affect. His behavior is normal.         Current Outpatient Prescriptions   Medication Sig    escitalopram oxalate (LEXAPRO) 5 mg tablet Take 1 Tab by mouth daily.  lisinopril (PRINIVIL, ZESTRIL) 5 mg tablet Take 1 Tab by mouth daily.  meloxicam (MOBIC) 15 mg tablet Take 1 Tab by mouth daily.  clonazePAM (KLONOPIN) 0.5 mg tablet Take 1 Tab by mouth nightly as needed. Max Daily Amount: 0.5 mg. No current facility-administered medications for this visit.          Past Medical History:   Diagnosis Date    Hypertension       Past Surgical History:   Procedure Laterality Date    HX MENISCUS REPAIR Left 2014    HX OTHER SURGICAL  2011    Rectal becker    HX TONSILLECTOMY  1975    HX [address]  [address] Social History   Substance Use Topics    Smoking status: Never Smoker    Smokeless tobacco: Never Used    Alcohol use Yes      Comment: rare      Family History   Problem Relation Age of Onset    Heart Disease Mother     Cancer Mother     Heart Disease Father     Hypertension Father     Dementia Father     Cancer Maternal Grandfather         Allergies   Allergen Reactions    Omnicef [Cefdinir] Hives        Assessment/Plan  Diagnoses and all orders for this visit:    1. Adjustment disorder, unspecified type - initially some issues with making him emotion-less. Much better recently. Discussed staying on min 6-12 mos. Would continue current dose. Has not required benzo. Over 50% of a visit of 25 minutes spent reviewing diagnosis and treatment options and side effects of medicines. -     escitalopram oxalate (LEXAPRO) 5 mg tablet; Take 1 Tab by mouth daily. 2. Essential hypertension  -     lisinopril (PRINIVIL, ZESTRIL) 5 mg tablet; Take 1 Tab by mouth daily. Follow-up Disposition:  Return in about 6 months (around 9/7/2018).     Roger Platt MD  3/7/2018

## 2018-03-07 NOTE — MR AVS SNAPSHOT
[address] [facility] 
 
 
 [address] [place] [address] 
[phone] Patient: Michael Huynh MRN: [id number] [facility]:[birth year] Visit Information Date & Time Provider Department Dept. Phone Encounter #  
 3/7/2018  7:30 AM Anastasiya Sanford MD [facility] [address] [id number] Follow-up Instructions Return in about 6 months (around 9/7/2018). Upcoming Health Maintenance Date Due DTaP/Tdap/Td series (2 - Td) 11/19/2023 Allergies as of 3/7/2018  Review Complete On: 4/0/8690 By: Anil Ferrer Severity Noted Reaction Type Reactions Omnicef [Cefdinir]  02/19/2014    Hives Current Immunizations  Never Reviewed No immunizations on file. Not reviewed this visit You Were Diagnosed With   
  
 Codes Comments Adjustment disorder, unspecified type    -  Primary ICD-10-CM: F43.20 ICD-9-CM: 309.9 Essential hypertension     ICD-10-CM: I10 
ICD-9-CM: 401.9 Vitals BP Pulse Temp Resp Height(growth percentile) Weight(growth percentile) 122/77 (BP 1 Location: Left arm, BP Patient Position: Sitting) 67 98 °F (36.7 °C) (Oral) 16 5' 10\" (1.778 m) 212 lb (96.2 kg) SpO2 BMI Smoking Status 95% 30.42 kg/m2 Never Smoker Vitals History BMI and BSA Data Body Mass Index Body Surface Area  
 30.42 kg/m 2 2.18 m 2 Preferred Pharmacy Pharmacy Name Phone [facility] [address] [place] [address] Liane Josie Ojeda [phone] Your Updated Medication List  
  
   
This list is accurate as of 3/7/18  8:04 AM.  Always use your most recent med list.  
  
  
  
  
 clonazePAM 0.5 mg tablet Commonly known as:  Mundo Natarajan Take 1 Tab by mouth nightly as needed. Max Daily Amount: 0.5 mg.  
  
 escitalopram oxalate 5 mg tablet Commonly known as:  Shashank Velez Take 1 Tab by mouth daily. lisinopril 5 mg tablet Commonly known as:  Lety Hawkins  
 Take 1 Tab by mouth daily. meloxicam 15 mg tablet Commonly known as:  MOBIC Take 1 Tab by mouth daily. Prescriptions Sent to Pharmacy Refills  
 escitalopram oxalate (LEXAPRO) 5 mg tablet 2 Sig: Take 1 Tab by mouth daily. Class: Normal  
 Pharmacy: [address], [place] [address] Ph #: [phone] Route: Oral  
 lisinopril (PRINIVIL, ZESTRIL) 5 mg tablet 3 Sig: Take 1 Tab by mouth daily. Class: Normal  
 Pharmacy: [address], [place] [address] Ph #: [phone] Route: Oral  
  
Follow-up Instructions Return in about 6 months (around 9/7/2018). Introducing [facility]! [facility] introduces Shijiebang patient portal. Now you can access parts of your medical record, email your doctor's office, and request medication refills online. 1. In your internet browser, go to [email or web address] 2. Click on the First Time User? Click Here link in the Sign In box. You will see the New Member Sign Up page. 3. Enter your Shijiebang Access Code exactly as it appears below. You will not need to use this code after youve completed the sign-up process. If you do not sign up before the expiration date, you must request a new code. · Shijiebang Access Code: [email or web address] Expires: 6/5/2018  8:04 AM 
 
4. Enter the last four digits of your Social Security Number (xxxx) and Date of Birth (mm/dd/yyyy) as indicated and click Submit. You will be taken to the next sign-up page. 5. Create a Communication Specialist Limitedt ID. This will be your Shijiebang login ID and cannot be changed, so think of one that is secure and easy to remember. 6. Create a [employer] password. You can change your password at any time. 7. Enter your Password Reset Question and Answer. This can be used at a later time if you forget your password. 8. Enter your e-mail address.  You will receive e-mail notification when new information is available in SeerGate. 9. Click Sign Up. You can now view and download portions of your medical record. 10. Click the Download Summary menu link to download a portable copy of your medical information. If you have questions, please visit the Frequently Asked Questions section of the SeerGate website. Remember, SeerGate is NOT to be used for urgent needs. For medical emergencies, dial 911. Now available from your iPhone and Android! Please provide this summary of care documentation to your next provider. Your primary care clinician is listed as Casandra Toth. If you have any questions after today's visit, please call [phone].

## 2018-08-14 ENCOUNTER — OFFICE VISIT (OUTPATIENT)
Dept: INTERNAL MEDICINE CLINIC | Age: 48
End: 2018-08-14

## 2018-08-14 VITALS
SYSTOLIC BLOOD PRESSURE: 118 MMHG | HEART RATE: 67 BPM | TEMPERATURE: 98 F | RESPIRATION RATE: 16 BRPM | BODY MASS INDEX: 33.07 KG/M2 | OXYGEN SATURATION: 95 % | HEIGHT: 70 IN | DIASTOLIC BLOOD PRESSURE: 79 MMHG | WEIGHT: 231 LBS

## 2018-08-14 DIAGNOSIS — I10 ESSENTIAL HYPERTENSION: ICD-10-CM

## 2018-08-14 DIAGNOSIS — M54.2 NECK PAIN: Primary | ICD-10-CM

## 2018-08-14 RX ORDER — IBUPROFEN 200 MG
600 TABLET ORAL 2 TIMES DAILY
COMMUNITY
End: 2019-02-06

## 2018-08-14 NOTE — PROGRESS NOTES
Luis Angel Recio is a 49 y.o. male who presents today for Neck Pain (started wed/thurs of last week)  . 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 Today patient is here for an acute visit. Will be going to [place] for a family beach trip. We are approaching one year from the passing of his mother. Musculoskeletal pain:  He wishes to address discomfort in the Left face which has moved to Lateral L neck at today's visit. This has been moderate in nature, Woke up 5 days ago pain TMJ and face. Resolved that day. Later that day began having pain to anterior L neck. No changes in pain. No recent illness. No drainage. No pain in teeth. No ear pain. Hypertension - stable. Hypertension ROS: taking medications as instructed, no medication side effects noted, no TIA's, no chest pain on exertion, no dyspnea on exertion, no swelling of ankles     reports that he has never smoked. He has never used smokeless tobacco.    reports that he drinks alcohol. BP Readings from Last 2 Encounters:   08/14/18 118/79   03/07/18 122/77       ROS  Review of Systems   Constitutional: Negative for chills, fever and weight loss. Respiratory: Negative for cough and shortness of breath. Cardiovascular: Negative for chest pain, palpitations and leg swelling. Gastrointestinal: Negative for abdominal pain, nausea and vomiting. Musculoskeletal: Positive for neck pain. Neurological: Negative. Endo/Heme/Allergies: Does not bruise/bleed easily. Psychiatric/Behavioral: Negative for depression. The patient is not nervous/anxious.         Visit Vitals    /79 (BP 1 Location: Left arm, BP Patient Position: Sitting)    Pulse 67    Temp 98 °F (36.7 °C) (Oral)    Resp 16    Ht 5' 10\" (1.778 m)    Wt 231 lb (104.8 kg)    SpO2 95%    BMI 33.15 kg/m2       Physical Exam   Constitutional: He is oriented to person, place, and time. He appears well-developed and well-nourished. HENT:   Head: Normocephalic and atraumatic. Eyes: EOM are normal. Pupils are equal, round, and reactive to light. Neck:       Pain where noted with deep palpation. Teeth normal. No swelling, LAD. No structural abnormality. Normal ROM of TM joint. TM clear. No carotid bruit. Normal ROM of neck. Normal UE strength. No thyromegaly. Cardiovascular:   No murmur heard. Pulmonary/Chest: Effort normal. No respiratory distress. Musculoskeletal: Normal range of motion. He exhibits no edema. Neurological: He is alert and oriented to person, place, and time. Skin: Skin is warm and dry. He is not diaphoretic. Psychiatric: He has a normal mood and affect. His behavior is normal.         Current Outpatient Prescriptions   Medication Sig    ibuprofen (ADVIL) 200 mg tablet Take 600 mg by mouth two (2) times a day.  escitalopram oxalate (LEXAPRO) 5 mg tablet Take 1 Tab by mouth daily.  lisinopril (PRINIVIL, ZESTRIL) 5 mg tablet Take 1 Tab by mouth daily.  meloxicam (MOBIC) 15 mg tablet Take 1 Tab by mouth daily.  clonazePAM (KLONOPIN) 0.5 mg tablet Take 1 Tab by mouth nightly as needed. Max Daily Amount: 0.5 mg. No current facility-administered medications for this visit.          Past Medical History:   Diagnosis Date    Hypertension       Past Surgical History:   Procedure Laterality Date    HX MENISCUS REPAIR Left 2014    HX OTHER SURGICAL  2011    Rectal becker    HX TONSILLECTOMY  1975    HX WISDOM TEETH EXTRACTION  1988      Social History   Substance Use Topics    Smoking status: Never Smoker    Smokeless tobacco: Never Used    Alcohol use Yes      Comment: rare      Family History   Problem Relation Age of Onset    Heart Disease Mother     Cancer Mother     Heart Disease Father     Hypertension Father     Dementia Father     Cancer Maternal Grandfather         Allergies   Allergen Reactions    Omnicef [Cefdinir] Hives        Assessment/Plan  Diagnoses and all orders for this visit:    1. Neck pain - No infectious symptoms. No abnormality on exam. No tobacco history. Discussed deep LN vs. Muscular issue. Suggest NSAIDs. If not better by next week, order US. 2. Essential hypertension - stable and controlled.          Follow-up Disposition: Not on File    Josse Mcnamara MD  8/14/2018

## 2018-08-14 NOTE — MR AVS SNAPSHOT
[address] [facility] 
 
 
 [address] [facility] [address] [facility] 
[phone] Patient: Carmela Meléndez MRN: [id number] HWQ:2/31/1858 Visit Information Date & Time Provider Department Dept. Phone Encounter #  
 8/14/2018  8:00 AM Ry Barajas MD [facility] [address]  [id number] Upcoming Health Maintenance Date Due Influenza Age 5 to Adult 8/1/2018 DTaP/Tdap/Td series (2 - Td) 11/19/2023 Allergies as of 8/14/2018  Review Complete On: 1/72/4541 By: Edgar Ashfords Severity Noted Reaction Type Reactions Omnicef [Cefdinir]  02/19/2014    Hives Current Immunizations  Never Reviewed No immunizations on file. Not reviewed this visit You Were Diagnosed With   
  
 Codes Comments Neck pain    -  Primary ICD-10-CM: M54.2 ICD-9-CM: 723.1 Essential hypertension     ICD-10-CM: I10 
ICD-9-CM: 401.9 Vitals BP Pulse Temp Resp Height(growth percentile) Weight(growth percentile) 118/79 (BP 1 Location: Left arm, BP Patient Position: Sitting) 67 98 °F (36.7 °C) (Oral) 16 5' 10\" (1.778 m) 231 lb (104.8 kg) SpO2 BMI Smoking Status 95% 33.15 kg/m2 Never Smoker Vitals History BMI and BSA Data Body Mass Index Body Surface Area  
 33.15 kg/m 2 2.28 m 2 Preferred Pharmacy Pharmacy Name Phone [address] [place] [address] [phone] Your Updated Medication List  
  
   
This list is accurate as of 8/14/18  8:29 AM.  Always use your most recent med list. ADVIL 200 mg tablet Generic drug:  ibuprofen Take 600 mg by mouth two (2) times a day. clonazePAM 0.5 mg tablet Commonly known as:  Elta Halsted Take 1 Tab by mouth nightly as needed. Max Daily Amount: 0.5 mg.  
  
 escitalopram oxalate 5 mg tablet Commonly known as:  Jevon Putty Take 1 Tab by mouth daily. lisinopril 5 mg tablet Commonly known as:  Merly Freed Take 1 Tab by mouth daily. meloxicam 15 mg tablet Commonly known as:  MOBIC Take 1 Tab by mouth daily. Introducing [facility]! [facility] introduces Vdolg patient portal. Now you can access parts of your medical record, email your doctor's office, and request medication refills online. 1. In your internet browser, go to [email or web address] 2. Click on the First Time User? Click Here link in the Sign In box. You will see the New Member Sign Up page. 3. Enter your Vdolg Access Code exactly as it appears below. You will not need to use this code after youve completed the sign-up process. If you do not sign up before the expiration date, you must request a new code. · Vdolg Access Code: [email or web address] Expires: 11/12/2018  8:28 AM 
 
4. Enter the last four digits of your Social Security Number (xxxx) and Date of Birth (mm/dd/yyyy) as indicated and click Submit. You will be taken to the next sign-up page. 5. Create a Vdolg ID. This will be your Vdolg login ID and cannot be changed, so think of one that is secure and easy to remember. 6. Create a Vdolg password. You can change your password at any time. 7. Enter your Password Reset Question and Answer. This can be used at a later time if you forget your password. 8. Enter your e-mail address. You will receive e-mail notification when new information is available in [address] 9. Click Sign Up. You can now view and download portions of your medical record. 10. Click the Download Summary menu link to download a portable copy of your medical information. If you have questions, please visit the Frequently Asked Questions section of the Vdolg website. Remember, Vdolg is NOT to be used for urgent needs. For medical emergencies, dial 911. Now available from your iPhone and Android! Please provide this summary of care documentation to your next provider. Your primary care clinician is listed as Keyona Farris. If you have any questions after today's visit, please call [phone].

## 2018-11-17 ENCOUNTER — HOSPITAL ENCOUNTER (OUTPATIENT)
Dept: MRI IMAGING | Age: 48
Discharge: HOME | End: 2018-11-17
Attending: ORTHOPAEDIC SURGERY
Payer: COMMERCIAL

## 2018-11-17 DIAGNOSIS — S63.502D SPRAIN OF WRIST, LEFT, SUBSEQUENT ENCOUNTER: ICD-10-CM

## 2018-11-17 PROCEDURE — 73221 MRI JOINT UPR EXTREM W/O DYE: CPT

## 2018-12-10 DIAGNOSIS — F43.20 ADJUSTMENT DISORDER, UNSPECIFIED TYPE: ICD-10-CM

## 2018-12-10 RX ORDER — ESCITALOPRAM OXALATE 5 MG/1
TABLET ORAL
Qty: 90 TAB | Refills: 2 | Status: SHIPPED | OUTPATIENT
Start: 2018-12-10 | End: 2019-12-12 | Stop reason: ALTCHOICE

## 2019-02-06 ENCOUNTER — OFFICE VISIT (OUTPATIENT)
Dept: INTERNAL MEDICINE CLINIC | Age: 49
End: 2019-02-06

## 2019-02-06 VITALS
TEMPERATURE: 97.9 F | SYSTOLIC BLOOD PRESSURE: 120 MMHG | WEIGHT: 231 LBS | HEART RATE: 66 BPM | DIASTOLIC BLOOD PRESSURE: 88 MMHG | BODY MASS INDEX: 33.07 KG/M2 | RESPIRATION RATE: 16 BRPM | OXYGEN SATURATION: 96 % | HEIGHT: 70 IN

## 2019-02-06 DIAGNOSIS — J32.9 SINUSITIS, UNSPECIFIED CHRONICITY, UNSPECIFIED LOCATION: ICD-10-CM

## 2019-02-06 DIAGNOSIS — M25.561 RIGHT KNEE PAIN, UNSPECIFIED CHRONICITY: ICD-10-CM

## 2019-02-06 DIAGNOSIS — F43.20 ADJUSTMENT DISORDER, UNSPECIFIED TYPE: ICD-10-CM

## 2019-02-06 DIAGNOSIS — I10 ESSENTIAL HYPERTENSION: ICD-10-CM

## 2019-02-06 DIAGNOSIS — M54.2 NECK PAIN: ICD-10-CM

## 2019-02-06 DIAGNOSIS — L91.8 SKIN TAG: ICD-10-CM

## 2019-02-06 DIAGNOSIS — Z00.00 WELLNESS EXAMINATION: Primary | ICD-10-CM

## 2019-02-06 RX ORDER — MELOXICAM 15 MG/1
15 TABLET ORAL DAILY
Qty: 30 TAB | Refills: 5 | Status: SHIPPED | OUTPATIENT
Start: 2019-02-06 | End: 2019-05-02 | Stop reason: SDUPTHER

## 2019-02-06 RX ORDER — DOXYCYCLINE 100 MG/1
100 TABLET ORAL 2 TIMES DAILY
Qty: 20 TAB | Refills: 0 | Status: SHIPPED | OUTPATIENT
Start: 2019-02-06 | End: 2019-02-16

## 2019-02-06 NOTE — PROGRESS NOTES
Lex Sanchez is a 51 y.o. male who presents today for Medication Evaluation; Hypertension; Depression; Joint Pain; and Neck Swelling Rashida Ivy He has a history of  
Patient Active Problem List  
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 Patient has a skin tag to left upper medial thigh. This tends to be irritated when he exercises. I provided him with [facility] dermatology contact to discuss removal of this Upper respiratory illness: 
Lex Sanchez presents with complaints of congestion, sore throat and LAD to L neck for 3 weeks. no nausea and no vomiting . Jaw and neck for1 week. he has not had  fever and chills. Notes that since December he has been having a good bit of sputum production and along a lot of colored mucus out of his nose. He has been using his children's nasal steroid. He has not back on his Claritin. symptoms are moderate. Over-the-counter remedies including advil congestion. Drinking plenty of fluids: no 
Asthma?:  no 
non-smoker Contacts with similar infections: yes, son with strep. Patient would like to resume daily Mobic. He has been put on this in the past due to knee and other arthritic pains. This helps prevent him from getting injuries and enable him to remain as physically active. Hypertension -patient stable on low-dose lisinopril. Hypertension ROS: taking medications as instructed, no medication side effects noted, no TIA's, no chest pain on exertion, no dyspnea on exertion, no swelling of ankles     reports that  has never smoked. he has never used smokeless tobacco.    reports that he drinks alcohol. BP Readings from Last 2 Encounters:  
02/06/19 120/88  
08/14/18 118/79 Adjustment D/O: Has been on Lexapro for some time. He is taking 5 mg of this. Had issues after loss of his mother. Father with dementia. Has been on SSRI for some time. He notes that overall his mood is stable he would like to come off the SSRI. I discussed cutting these in half for a week or 2 and seeing if it makes a difference and if not stop altogether. Health maintenance hx includes: 
Exercise: moderately active. Form of exercise: Cardio and start and increase this. Diet:Eating better. Social: working in IT. At home with wife and 2 children Cancer screening:  
 Colon cancer screening:  Next year. Prostate cancer screening: N/A Immunizations: There is no immunization history on file for this patient. Immunization status: stated as current, but no records available. ROS Review of Systems Constitutional: Negative for chills, fever and weight loss. HENT: Positive for congestion and sore throat. Eyes: Negative for blurred vision, double vision and photophobia. Respiratory: Negative for cough and shortness of breath. Cardiovascular: Negative for chest pain, palpitations and leg swelling. Gastrointestinal: Negative for abdominal pain, constipation, diarrhea, heartburn, nausea and vomiting. Genitourinary: Negative for dysuria, frequency and urgency. Musculoskeletal: Positive for neck pain. Negative for joint pain and myalgias. Skin: Positive for rash. Neurological: Negative. Negative for headaches. Endo/Heme/Allergies: Does not bruise/bleed easily. Psychiatric/Behavioral: Negative for memory loss and suicidal ideas. Visit Vitals /88 (BP 1 Location: Left arm, BP Patient Position: Sitting) Pulse 66 Temp 97.9 °F (36.6 °C) (Oral) Resp 16 Ht 5' 10\" (1.778 m) Wt 231 lb (104.8 kg) SpO2 96% BMI 33.15 kg/m² Physical Exam  
Constitutional: He is oriented to person, place, and time. He appears well-developed and well-nourished. HENT:  
Head: Normocephalic and atraumatic. Small amount of fluid behind both tympanic membranes. Oropharynx clear. Eyes: EOM are normal. Pupils are equal, round, and reactive to light. Neck:  
Several mild tender enlarged lymph nodes to left submandibular area Cardiovascular: Normal rate and regular rhythm. No murmur heard. Pulmonary/Chest: Effort normal and breath sounds normal. No respiratory distress. No wheezing no egophony Musculoskeletal: Normal range of motion. He exhibits no edema. Legs: 
Small skin tag were noted Neurological: He is alert and oriented to person, place, and time. Skin: Skin is warm and dry. He is not diaphoretic. Psychiatric: He has a normal mood and affect. His behavior is normal.  
 
 
 
Current Outpatient Medications Medication Sig  escitalopram oxalate (LEXAPRO) 5 mg tablet TAKE 1 TABLET BY MOUTH EVERY DAY  ibuprofen (ADVIL) 200 mg tablet Take 600 mg by mouth two (2) times a day.  lisinopril (PRINIVIL, ZESTRIL) 5 mg tablet Take 1 Tab by mouth daily.  meloxicam (MOBIC) 15 mg tablet Take 1 Tab by mouth daily.  clonazePAM (KLONOPIN) 0.5 mg tablet Take 1 Tab by mouth nightly as needed. Max Daily Amount: 0.5 mg. No current facility-administered medications for this visit. Past Medical History:  
Diagnosis Date  Hypertension Past Surgical History:  
Procedure Laterality Date  HX MENISCUS REPAIR Left 2014  HX OTHER SURGICAL  2011 Rectal becker [address] [facility] [address] Social History Tobacco Use  Smoking status: Never Smoker  Smokeless tobacco: Never Used Substance Use Topics  Alcohol use: Yes Comment: rare Family History Problem Relation Age of Onset  Heart Disease Mother  Cancer Mother  Heart Disease Father  Hypertension Father  Dementia Father  Cancer Maternal Grandfather Allergies Allergen Reactions  Omnicef [Cefdinir] Hives Assessment/Plan Diagnoses and all orders for this visit: 
 
 1. Wellness examination -we will begin prostate and colorectal cancer screening next year. Patient instructed to try to lose weight. Cesar Liang was counseled on age-appropriate/ guideline-based risk prevention behaviors and screening for a 51y.o. year old   male . We also discussed adjustments in screening based on family history if necessary. Printed instructions for preventative screening guidelines and healthy behaviors given to patient with after visit summary. 2. Right knee pain, unspecified chronicity -we will need to monitor his creatinine. Return in 6 months for repeat 
-     meloxicam (MOBIC) 15 mg tablet; Take 1 Tab by mouth daily. 3. Adjustment disorder, unspecified type -overall doing much better. Discussed cutting these in half in a couple weeks and if no changes he can stop them altogether 4. Essential hypertension -     LIPID PANEL 
-     METABOLIC PANEL, COMPREHENSIVE 5. Neck pain =-secondary to mild lymphadenopathy. Given history we will treat for sinus infection. -     meloxicam (MOBIC) 15 mg tablet; Take 1 Tab by mouth daily. 
-     LIPID PANEL 
-     METABOLIC PANEL, COMPREHENSIVE 6. Sinusitis, unspecified chronicity, unspecified location 
-     doxycycline (ADOXA) 100 mg tablet; Take 1 Tab by mouth two (2) times a day for 10 days. 7. Skin tag -to left medial thigh. Does not look concerning for malignancy. [facility] 
 
 
 
Follow-up Disposition: Not on File Francisco Norris MD 
2/6/2019 Discussed the patient's BMI with him. The BMI follow up plan is as follows:  
 
dietary management education, guidance, and counseling 
encourage exercise 
monitor weight 
prescribed dietary intake An After Visit Summary was printed and given to the patient.

## 2019-02-06 NOTE — PATIENT INSTRUCTIONS
Body Mass Index: Care Instructions Your Care Instructions Body mass index (BMI) can help you see if your weight is raising your risk for health problems. It uses a formula to compare how much you weigh with how tall you are. · A BMI lower than 18.5 is considered underweight. · A BMI between 18.5 and 24.9 is considered healthy. · A BMI between 25 and 29.9 is considered overweight. A BMI of 30 or higher is considered obese. If your BMI is in the normal range, it means that you have a lower risk for weight-related health problems. If your BMI is in the overweight or obese range, you may be at increased risk for weight-related health problems, such as high blood pressure, heart disease, stroke, arthritis or joint pain, and diabetes. If your BMI is in the underweight range, you may be at increased risk for health problems such as fatigue, lower protection (immunity) against illness, muscle loss, bone loss, hair loss, and hormone problems. BMI is just one measure of your risk for weight-related health problems. You may be at higher risk for health problems if you are not active, you eat an unhealthy diet, or you drink too much alcohol or use tobacco products. Follow-up care is a key part of your treatment and safety. Be sure to make and go to all appointments, and call your doctor if you are having problems. It's also a good idea to know your test results and keep a list of the medicines you take. How can you care for yourself at home? · Practice healthy eating habits. This includes eating plenty of fruits, vegetables, whole grains, lean protein, and low-fat dairy. · If your doctor recommends it, get more exercise. Walking is a good choice. Bit by bit, increase the amount you walk every day. Try for at least 30 minutes on most days of the week. · Do not smoke. Smoking can increase your risk for health problems.  If you need help quitting, talk to your doctor about stop-smoking programs and medicines. These can increase your chances of quitting for good. · Limit alcohol to 2 drinks a day for men and 1 drink a day for women. Too much alcohol can cause health problems. If you have a BMI higher than 25 · Your doctor may do other tests to check your risk for weight-related health problems. This may include measuring the distance around your waist. A waist measurement of more than 40 inches in men or 35 inches in women can increase the risk of weight-related health problems. · Talk with your doctor about steps you can take to stay healthy or improve your health. You may need to make lifestyle changes to lose weight and stay healthy, such as changing your diet and getting regular exercise. If you have a BMI lower than 18.5 · Your doctor may do other tests to check your risk for health problems. · Talk with your doctor about steps you can take to stay healthy or improve your health. You may need to make lifestyle changes to gain or maintain weight and stay healthy, such as getting more healthy foods in your diet and doing exercises to build muscle. Where can you learn more? Go to http://jennifer-annabelle.info/. Enter S176 in the search box to learn more about \"Body Mass Index: Care Instructions. \" Current as of: October 13, 2016 Content Version: 11.4 © 2121-0481 [employer]. Care instructions adapted under license by [employer] (which disclaims liability or warranty for this information). If you have questions about a medical condition or this instruction, always ask your healthcare professional. [place] [address] any warranty or liability for your use of this information. Sinusitis: Care Instructions Your Care Instructions Sinusitis is an infection of the lining of the sinus cavities in your head. Sinusitis often follows a cold. It causes pain and pressure in your head and face. In most cases, sinusitis gets better on its own in 1 to 2 weeks. But some mild symptoms may last for several weeks. Sometimes antibiotics are needed. Follow-up care is a key part of your treatment and safety. Be sure to make and go to all appointments, and call your doctor if you are having problems. It's also a good idea to know your test results and keep a list of the medicines you take. How can you care for yourself at home? · Take an over-the-counter pain medicine, such as acetaminophen (Tylenol), ibuprofen (Advil, Motrin), or naproxen (Aleve). Read and follow all instructions on the label. · If the doctor prescribed antibiotics, take them as directed. Do not stop taking them just because you feel better. You need to take the full course of antibiotics. · Be careful when taking over-the-counter cold or flu medicines and Tylenol at the same time. Many of these medicines have acetaminophen, which is Tylenol. Read the labels to make sure that you are not taking more than the recommended dose. Too much acetaminophen (Tylenol) can be harmful. · Breathe warm, moist air from a steamy shower, a hot bath, or a sink filled with hot water. Avoid cold, dry air. Using a humidifier in your home may help. Follow the directions for cleaning the machine. · Use saline (saltwater) nasal washes to help keep your nasal passages open and wash out mucus and bacteria. You can buy saline nose drops at a grocery store or drugstore. Or you can make your own at home by adding 1 teaspoon of salt and 1 teaspoon of baking soda to 2 cups of distilled water. If you make your own, fill a bulb syringe with the solution, insert the tip into your nostril, and squeeze gently. Fadumo Chrystal your nose. · Put a hot, wet towel or a warm gel pack on your face 3 or 4 times a day for 5 to 10 minutes each time. · Try a decongestant nasal spray like oxymetazoline (Afrin).  Do not use it for more than 3 days in a row. Using it for more than 3 days can make your congestion worse. When should you call for help? Call your doctor now or seek immediate medical care if: 
  · You have new or worse swelling or redness in your face or around your eyes.  
  · You have a new or higher fever.  
 Watch closely for changes in your health, and be sure to contact your doctor if: 
  · You have new or worse facial pain.  
  · The mucus from your nose becomes thicker (like pus) or has new blood in it.  
  · You are not getting better as expected. Where can you learn more? Go to http://jennifer-annabelle.info/. Enter C189 in the search box to learn more about \"Sinusitis: Care Instructions. \" Current as of: March 27, 2018 Content Version: 11.9 © 4865-6178 [employer]. Care instructions adapted under license by [facility] (which disclaims liability or warranty for this information). If you have questions about a medical condition or this instruction, always ask your healthcare professional. [address] any warranty or liability for your use of this information.

## 2019-03-28 DIAGNOSIS — I10 ESSENTIAL HYPERTENSION: ICD-10-CM

## 2019-03-28 RX ORDER — LISINOPRIL 5 MG/1
TABLET ORAL
Qty: 90 TAB | Refills: 2 | Status: SHIPPED | OUTPATIENT
Start: 2019-03-28 | End: 2019-05-02 | Stop reason: SDUPTHER

## 2019-05-02 DIAGNOSIS — I10 ESSENTIAL HYPERTENSION: ICD-10-CM

## 2019-05-02 DIAGNOSIS — M54.2 NECK PAIN: ICD-10-CM

## 2019-05-02 DIAGNOSIS — M25.561 RIGHT KNEE PAIN, UNSPECIFIED CHRONICITY: ICD-10-CM

## 2019-05-02 RX ORDER — MELOXICAM 15 MG/1
15 TABLET ORAL DAILY
Qty: 90 TAB | Refills: 0 | Status: SHIPPED | OUTPATIENT
Start: 2019-05-02 | End: 2019-07-13 | Stop reason: SDUPTHER

## 2019-05-02 RX ORDER — LISINOPRIL 5 MG/1
TABLET ORAL
Qty: 90 TAB | Refills: 2 | Status: SHIPPED | OUTPATIENT
Start: 2019-05-02 | End: 2020-01-13 | Stop reason: SDUPTHER

## 2019-07-13 DIAGNOSIS — M54.2 NECK PAIN: ICD-10-CM

## 2019-07-13 DIAGNOSIS — M25.561 RIGHT KNEE PAIN, UNSPECIFIED CHRONICITY: ICD-10-CM

## 2019-07-13 RX ORDER — MELOXICAM 15 MG/1
TABLET ORAL
Qty: 90 TAB | Refills: 1 | Status: SHIPPED | OUTPATIENT
Start: 2019-07-13 | End: 2020-01-13 | Stop reason: SDUPTHER

## 2019-08-26 ENCOUNTER — TELEPHONE (OUTPATIENT)
Dept: INTERNAL MEDICINE CLINIC | Age: 49
End: 2019-08-26

## 2019-08-26 NOTE — TELEPHONE ENCOUNTER
Patient is requesting to speak with the   in regards to a billing issue, patient received notice from our collection agency for the amount of $69.79, discussed with patient that when he was seen on 02/06/2019 his deductible was $59.79 , and a bill for a $10 copy for a visit on  11/2017 was carried over , per billing statements in patient's chart. Patient states the billing statements were mailed to an old PO BOX number that was used over 10 years ago , confirmed with patient statements have been going to a PO BOX and I was not able to explain why that was, patient's new patient apt with our office was was on 07/2017 and no PO BOX was listed on his demographics . Patient states his address is up to date with his insurance company . Requesting we pull his account from the collection agency due to sending billing invoices to an inncorrect address. Patient is willing to pay the amount in full but wants his account removed from collections. msge will be marked urgent per patient's request for immediate attention to this matter , aware  is not in the office as of this moment but will give a call back as soon as she can    Patient can be reached at [phone].

## 2019-12-12 ENCOUNTER — OFFICE VISIT (OUTPATIENT)
Dept: INTERNAL MEDICINE CLINIC | Age: 49
End: 2019-12-12

## 2019-12-12 VITALS
SYSTOLIC BLOOD PRESSURE: 121 MMHG | OXYGEN SATURATION: 96 % | BODY MASS INDEX: 32.9 KG/M2 | HEIGHT: 70 IN | DIASTOLIC BLOOD PRESSURE: 87 MMHG | TEMPERATURE: 97.4 F | RESPIRATION RATE: 14 BRPM | WEIGHT: 229.8 LBS | HEART RATE: 61 BPM

## 2019-12-12 DIAGNOSIS — R42 LIGHTHEADEDNESS: Primary | ICD-10-CM

## 2019-12-12 DIAGNOSIS — R42 LIGHTHEADEDNESS: ICD-10-CM

## 2019-12-12 DIAGNOSIS — I10 ESSENTIAL HYPERTENSION: ICD-10-CM

## 2019-12-12 DIAGNOSIS — J06.9 VIRAL URI: ICD-10-CM

## 2019-12-12 NOTE — PATIENT INSTRUCTIONS
Lightheadedness or Faintness: Care Instructions Your Care Instructions Lightheadedness is a feeling that you are about to faint or \"pass out. \" You do not feel as if you or your surroundings are moving. It is different from vertigo, which is the feeling that you or things around you are spinning or tilting. Lightheadedness usually goes away or gets better when you lie down. If lightheadedness gets worse, it can lead to a fainting spell. It is common to feel lightheaded from time to time. Lightheadedness usually is not caused by a serious problem. It often is caused by a short-lasting drop in blood pressure and blood flow to your head that occurs when you get up too quickly from a seated or lying position. Follow-up care is a key part of your treatment and safety. Be sure to make and go to all appointments, and call your doctor if you are having problems. It's also a good idea to know your test results and keep a list of the medicines you take. How can you care for yourself at home? · Lie down for 1 or 2 minutes when you feel lightheaded. After lying down, sit up slowly and remain sitting for 1 to 2 minutes before slowly standing up. · Avoid movements, positions, or activities that have made you lightheaded in the past. 
· Get plenty of rest, especially if you have a cold or flu, which can cause lightheadedness. · Make sure you drink plenty of fluids, especially if you have a fever or have been sweating. · Do not drive or put yourself and others in danger while you feel lightheaded. When should you call for help? Call 911 anytime you think you may need emergency care. For example, call if: 
  · You have symptoms of a stroke. These may include: 
? Sudden numbness, tingling, weakness, or loss of movement in your face, arm, or leg, especially on only one side of your body. ? Sudden vision changes. ? Sudden trouble speaking. ? Sudden confusion or trouble understanding simple statements. ? Sudden problems with walking or balance. ? A sudden, severe headache that is different from past headaches.  
  · You have symptoms of a heart attack. These may include: 
? Chest pain or pressure, or a strange feeling in the chest. 
? Sweating. ? Shortness of breath. ? Nausea or vomiting. ? Pain, pressure, or a strange feeling in the back, neck, jaw, or upper belly or in one or both shoulders or arms. ? Lightheadedness or sudden weakness. ? A fast or irregular heartbeat. After you call 911, the  may tell you to chew 1 adult-strength or 2 to 4 low-dose aspirin. Wait for an ambulance. Do not try to drive yourself.  
 Watch closely for changes in your health, and be sure to contact your doctor if: 
  · Your lightheadedness gets worse or does not get better with home care. Where can you learn more? Go to http://jennifer-annabelle.info/. Enter Z728 in the search box to learn more about \"Lightheadedness or Faintness: Care Instructions. \" Current as of: June 26, 2019 Content Version: 12.2 © 3995-1280 [employer]. Care instructions adapted under license by [employer] (which disclaims liability or warranty for this information). If you have questions about a medical condition or this instruction, always ask your healthcare professional. [address] any warranty or liability for your use of this information. Viral Infections: Care Instructions Your Care Instructions You don't feel well, but it's not clear what's causing it. You may have a viral infection. Viruses cause many illnesses, such as the common cold, influenza, fever, rashes, and the diarrhea, nausea, and vomiting that are often called \"stomach flu. \" You may wonder if antibiotic medicines could make you feel better. But antibiotics only treat infections caused by bacteria. They don't work on viruses. The good news is that viral infections usually aren't serious.  Most will go away in a few days without medical treatment. In the meantime, there are a few things you can do to make yourself more comfortable. Follow-up care is a key part of your treatment and safety. Be sure to make and go to all appointments, and call your doctor if you are having problems. It's also a good idea to know your test results and keep a list of the medicines you take. How can you care for yourself at home? · Get plenty of rest if you feel tired. · Take an over-the-counter pain medicine if needed, such as acetaminophen (Tylenol), ibuprofen (Advil, Motrin), or naproxen (Aleve). Read and follow all instructions on the label. · Be careful when taking over-the-counter cold or flu medicines and Tylenol at the same time. Many of these medicines have acetaminophen, which is Tylenol. Read the labels to make sure that you are not taking more than the recommended dose. Too much acetaminophen (Tylenol) can be harmful. · Drink plenty of fluids, enough so that your urine is light yellow or clear like water. If you have kidney, heart, or liver disease and have to limit fluids, talk with your doctor before you increase the amount of fluids you drink. · Stay home from work, school, and other public places while you have a fever. When should you call for help? Call 911 anytime you think you may need emergency care. For example, call if: 
  · You have severe trouble breathing.  
  · You passed out (lost consciousness).  
 Call your doctor now or seek immediate medical care if: 
  · You seem to be getting much sicker.  
  · You have a new or higher fever.  
  · You have blood in your stools.  
  · You have new belly pain, or your pain gets worse.  
  · You have a new rash.  
 Watch closely for changes in your health, and be sure to contact your doctor if: 
  · You start to get better and then get worse.  
  · You do not get better as expected. Where can you learn more? Go to http://jennifer-annabelle.info/. Enter M447 in the search box to learn more about \"Viral Infections: Care Instructions. \" Current as of: June 9, 2019 Content Version: 12.2 © 2558-4886 [employer]. Care instructions adapted under license by [employer] (which disclaims liability or warranty for this information). If you have questions about a medical condition or this instruction, always ask your healthcare professional. [address] any warranty or liability for your use of this information.

## 2019-12-13 ENCOUNTER — TELEPHONE (OUTPATIENT)
Dept: INTERNAL MEDICINE CLINIC | Age: 49
End: 2019-12-13

## 2019-12-13 LAB
ALBUMIN SERPL-MCNC: 4.8 G/DL (ref 3.5–5.5)
ALBUMIN/GLOB SERPL: 2.5 {RATIO} (ref 1.2–2.2)
ALP SERPL-CCNC: 43 IU/L (ref 39–117)
ALT SERPL-CCNC: 27 IU/L (ref 0–44)
AST SERPL-CCNC: 18 IU/L (ref 0–40)
BASOPHILS # BLD AUTO: 0 X10E3/UL (ref 0–0.2)
BASOPHILS NFR BLD AUTO: 1 %
BILIRUB SERPL-MCNC: 0.5 MG/DL (ref 0–1.2)
BUN SERPL-MCNC: 13 MG/DL (ref 6–24)
BUN/CREAT SERPL: 12 (ref 9–20)
CALCIUM SERPL-MCNC: 9.8 MG/DL (ref 8.7–10.2)
CHLORIDE SERPL-SCNC: 104 MMOL/L (ref 96–106)
CO2 SERPL-SCNC: 24 MMOL/L (ref 20–29)
CREAT SERPL-MCNC: 1.12 MG/DL (ref 0.76–1.27)
EOSINOPHIL # BLD AUTO: 0.2 X10E3/UL (ref 0–0.4)
EOSINOPHIL NFR BLD AUTO: 4 %
ERYTHROCYTE [DISTWIDTH] IN BLOOD BY AUTOMATED COUNT: 13 % (ref 12.3–15.4)
GLOBULIN SER CALC-MCNC: 1.9 G/DL (ref 1.5–4.5)
GLUCOSE SERPL-MCNC: 89 MG/DL (ref 65–99)
HCT VFR BLD AUTO: 48.4 % (ref 37.5–51)
HGB BLD-MCNC: 16.6 G/DL (ref 13–17.7)
IMM GRANULOCYTES # BLD AUTO: 0 X10E3/UL (ref 0–0.1)
IMM GRANULOCYTES NFR BLD AUTO: 1 %
LYMPHOCYTES # BLD AUTO: 1.5 X10E3/UL (ref 0.7–3.1)
LYMPHOCYTES NFR BLD AUTO: 33 %
MCH RBC QN AUTO: 32.3 PG (ref 26.6–33)
MCHC RBC AUTO-ENTMCNC: 34.3 G/DL (ref 31.5–35.7)
MCV RBC AUTO: 94 FL (ref 79–97)
MONOCYTES # BLD AUTO: 0.5 X10E3/UL (ref 0.1–0.9)
MONOCYTES NFR BLD AUTO: 10 %
NEUTROPHILS # BLD AUTO: 2.3 X10E3/UL (ref 1.4–7)
NEUTROPHILS NFR BLD AUTO: 51 %
PLATELET # BLD AUTO: 227 X10E3/UL (ref 150–450)
POTASSIUM SERPL-SCNC: 4.8 MMOL/L (ref 3.5–5.2)
PROT SERPL-MCNC: 6.7 G/DL (ref 6–8.5)
RBC # BLD AUTO: 5.14 X10E6/UL (ref 4.14–5.8)
SODIUM SERPL-SCNC: 141 MMOL/L (ref 134–144)
WBC # BLD AUTO: 4.4 X10E3/UL (ref 3.4–10.8)

## 2019-12-13 NOTE — TELEPHONE ENCOUNTER
----- Message from Lizbeth Solomon NP sent at 12/13/2019 12:32 PM EST -----    ----- Message -----  From: Derek Bergman Lab Results In  Sent: 12/13/2019   9:08 AM EST  To: Lizbeth Solomon NP

## 2019-12-15 NOTE — PROGRESS NOTES
HISTORY OF PRESENT ILLNESS  Maddy Plaza is a 47 y.o. male. HPI  Presents with complaints of sensation of dizziness and lightheadedness that began this morning upon awakening. Has felt slightly off-balance but denies spinning sensation. Has some nasal congestion that began 2 days ago and he has taken some Tylenol sinus which appeared to help with symptoms temporarily. Denies fever, chills, chest congestion, cough, purulent mucous, shortness of breath or chest pain/pressure. Reports lightheadedness does not seem to be affected by position changes. Admits that he has not been drinking as much fluid as he normally does but has been voiding normally. Reports 2 of his coworkers have been ill this week. Blood pressure has been stable on Lisinopril 5 mg daily. He has not noted any prior episodes of lightheadedness/faintness. Denies pedal edema.     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    Essential hypertension I10     Past Surgical History:   Procedure Laterality Date    HX MENISCUS REPAIR Left 2014    HX OTHER SURGICAL  2011    Rectal becker    HX OTHER SURGICAL  2019    Basal cell removal on face    HX TONSILLECTOMY  1975    HX WISDOM TEETH EXTRACTION  1988     Social History     Socioeconomic History    Marital status:      Spouse name: Not on file    Number of children: Not on file    Years of education: Not on file    Highest education level: Not on file   Occupational History    Not on file   Social Needs    Financial resource strain: Not on file    Food insecurity:     Worry: Not on file     Inability: Not on file    Transportation needs:     Medical: Not on file     Non-medical: Not on file   Tobacco Use    Smoking status: Never Smoker    Smokeless tobacco: Never Used   Substance and Sexual Activity    Alcohol use: Yes     Comment: rare    Drug use: No    Sexual activity: Yes   Lifestyle    Physical activity: Days per week: Not on file     Minutes per session: Not on file    Stress: Not on file   Relationships    Social connections:     Talks on phone: Not on file     Gets together: Not on file     Attends Orthodox service: Not on file     Active member of club or organization: Not on file     Attends meetings of clubs or organizations: Not on file     Relationship status: Not on file    Intimate partner violence:     Fear of current or ex partner: Not on file     Emotionally abused: Not on file     Physically abused: Not on file     Forced sexual activity: Not on file   Other Topics Concern    Not on file   Social History Narrative    Not on file     Family History   Problem Relation Age of Onset    Heart Disease Mother     Cancer Mother     Heart Disease Father     Hypertension Father     Dementia Father     Cancer Maternal Grandfather      Current Outpatient Medications   Medication Sig    meloxicam (MOBIC) 15 mg tablet TAKE 1 TABLET DAILY (Patient taking differently: as needed.)    lisinopril (PRINIVIL, ZESTRIL) 5 mg tablet TAKE 1 TABLET BY MOUTH EVERY DAY     No current facility-administered medications for this visit. Allergies   Allergen Reactions    Omnicef [Cefdinir] Hives     Immunization History   Administered Date(s) Administered    Influenza Vaccine (Quad) PF 10/29/2019       Review of Systems   Constitutional: Positive for malaise/fatigue. Negative for chills and fever. HENT: Positive for congestion. Negative for ear pain and sore throat. Eyes: Negative for blurred vision. Respiratory: Negative for cough and shortness of breath. Cardiovascular: Negative for chest pain and palpitations. Gastrointestinal: Negative for nausea and vomiting. Musculoskeletal: Negative for myalgias. Skin: Negative for rash. Neurological: Positive for dizziness and headaches. Negative for tingling and loss of consciousness. Psychiatric/Behavioral: The patient is nervous/anxious. Orthostatic Blood Pressure:  Blood pressure:    lying 135/85, sitting 130/89, standing 121/87  Pulse:    lying 60, sitting 66, standing 61  Physical Exam  Vitals signs and nursing note reviewed. Constitutional:       Appearance: Normal appearance. HENT:      Head: Normocephalic and atraumatic. Right Ear: Tympanic membrane and external ear normal.      Left Ear: Tympanic membrane and external ear normal.      Nose: Congestion present. Mouth/Throat:      Mouth: Mucous membranes are moist.      Pharynx: Oropharynx is clear. Eyes:      Extraocular Movements: Extraocular movements intact. Right eye: No nystagmus. Left eye: No nystagmus. Pupils: Pupils are equal, round, and reactive to light. Neck:      Musculoskeletal: Normal range of motion and neck supple. Cardiovascular:      Rate and Rhythm: Normal rate and regular rhythm. Heart sounds: No murmur. Pulmonary:      Effort: Pulmonary effort is normal.      Breath sounds: Normal breath sounds. No wheezing. Musculoskeletal: Normal range of motion. General: No swelling. Skin:     General: Skin is warm and dry. Neurological:      General: No focal deficit present. Mental Status: He is alert and oriented to person, place, and time. Coordination: Coordination normal.   Psychiatric:         Mood and Affect: Mood is anxious. Speech: Speech normal.         ASSESSMENT and PLAN  Diagnoses and all orders for this visit:    1. Lightheadedness -- advised him to increase fluids, rest  -     CBC WITH AUTOMATED DIFF; Future  -     METABOLIC PANEL, COMPREHENSIVE; Future    2. Viral URI -- symptomatic treatment at this point    3. Essential hypertension -- stable  -     METABOLIC PANEL, COMPREHENSIVE;  Future      lab results and schedule of future lab studies reviewed with patient  reviewed diet, exercise and weight control  reviewed medications and side effects in detail

## 2020-01-09 ENCOUNTER — OFFICE VISIT (OUTPATIENT)
Dept: INTERNAL MEDICINE CLINIC | Age: 50
End: 2020-01-09

## 2020-01-09 VITALS
BODY MASS INDEX: 33.07 KG/M2 | HEIGHT: 70 IN | HEART RATE: 70 BPM | OXYGEN SATURATION: 98 % | TEMPERATURE: 98.5 F | WEIGHT: 231 LBS | RESPIRATION RATE: 16 BRPM | DIASTOLIC BLOOD PRESSURE: 80 MMHG | SYSTOLIC BLOOD PRESSURE: 112 MMHG

## 2020-01-09 DIAGNOSIS — J32.9 SINUSITIS, UNSPECIFIED CHRONICITY, UNSPECIFIED LOCATION: Primary | ICD-10-CM

## 2020-01-09 RX ORDER — DOXYCYCLINE 100 MG/1
100 TABLET ORAL 2 TIMES DAILY
Qty: 20 TAB | Refills: 0 | Status: SHIPPED | OUTPATIENT
Start: 2020-01-09 | End: 2020-01-19

## 2020-01-09 RX ORDER — GUAIFENESIN 600 MG/1
600 TABLET, EXTENDED RELEASE ORAL 2 TIMES DAILY
COMMUNITY
End: 2021-10-01 | Stop reason: ALTCHOICE

## 2020-01-09 RX ORDER — DOXYCYCLINE 100 MG/1
100 TABLET ORAL 2 TIMES DAILY
Qty: 20 TAB | Refills: 0 | Status: SHIPPED | OUTPATIENT
Start: 2020-01-09 | End: 2020-01-09 | Stop reason: SDUPTHER

## 2020-01-09 NOTE — PROGRESS NOTES
Galileo Dean is a 47 y.o. male who presents today for Sore Throat; Nasal Congestion; and Ear Fullness  . 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 Today patient is here for an acute visit. Upper respiratory illness:  Galileo Dean presents with complaints of congestion, sore throat, post nasal drip, headache, bilateral sinus pain and hoarseness for 3 weeks. no nausea and no vomiting . he has not had  fever and chills. Symptoms are moderate. Over-the-counter remedies including Tylenol, mucinex. Drinking plenty of fluids: yes  Asthma?:  no  non-smoker  Contacts with similar infections: no     Hypertension - stable. Hypertension ROS: taking medications as instructed, no medication side effects noted, no TIA's, no chest pain on exertion, no dyspnea on exertion, no swelling of ankles     reports that he has never smoked. He has never used smokeless tobacco.    reports current alcohol use. BP Readings from Last 2 Encounters:   01/09/20 112/80   12/12/19 121/87         ROS  Review of Systems   Constitutional: Positive for malaise/fatigue. Negative for chills, fever and weight loss. HENT: Positive for congestion and sinus pain. Eyes: Negative for blurred vision, double vision and photophobia. Respiratory: Negative for cough, hemoptysis, sputum production, shortness of breath, wheezing and stridor. Cardiovascular: Negative for chest pain, palpitations and leg swelling. Gastrointestinal: Negative for abdominal pain, constipation, diarrhea, heartburn, nausea and vomiting. Genitourinary: Negative for dysuria, frequency and urgency. Musculoskeletal: Negative for joint pain and myalgias. Skin: Negative for rash. Neurological: Negative. Negative for headaches. Endo/Heme/Allergies: Does not bruise/bleed easily.    Psychiatric/Behavioral: Negative for memory loss and suicidal ideas.       Visit Vitals  /80 (BP 1 Location: Left arm, BP Patient Position: Sitting)   Pulse 70   Temp 98.5 °F (36.9 °C) (Oral)   Resp 16   Ht 5' 10\" (1.778 m)   Wt 231 lb (104.8 kg)   SpO2 98%   BMI 33.15 kg/m²       Physical Exam  Constitutional:       Appearance: He is well-developed. He is not diaphoretic. HENT:      Head: Normocephalic and atraumatic. Right Ear: Tympanic membrane and ear canal normal.      Left Ear: Tympanic membrane and ear canal normal.      Ears:      Comments: Fluid behind both tympanic membranes. Eyes:      Pupils: Pupils are equal, round, and reactive to light. Neck:      Musculoskeletal: Normal range of motion and neck supple. Vascular: No JVD. Cardiovascular:      Rate and Rhythm: Normal rate and regular rhythm. Heart sounds: No murmur. Pulmonary:      Effort: Pulmonary effort is normal. No respiratory distress. Breath sounds: Normal breath sounds. No wheezing. Comments: Lungs clear no egophony no wheezing  Abdominal:      General: Bowel sounds are normal. There is no distension. Palpations: Abdomen is soft. Tenderness: There is no tenderness. Musculoskeletal: Normal range of motion. Skin:     General: Skin is warm and dry. Neurological:      Mental Status: He is alert and oriented to person, place, and time. Cranial Nerves: No cranial nerve deficit. Psychiatric:         Behavior: Behavior normal.           Current Outpatient Medications   Medication Sig    Phenylephrine-DM-Acetaminophen (TYLENOL COLD MULTI-SYMPTOM DAY) 5- mg/15 mL liqd Take  by mouth.  meloxicam (MOBIC) 15 mg tablet TAKE 1 TABLET DAILY (Patient taking differently: as needed.)    lisinopril (PRINIVIL, ZESTRIL) 5 mg tablet TAKE 1 TABLET BY MOUTH EVERY DAY     No current facility-administered medications for this visit.          Past Medical History:   Diagnosis Date    Hypertension       Past Surgical History:   Procedure Laterality Date    HX MENISCUS REPAIR Left 2014    HX OTHER SURGICAL  2011    Rectal becker    HX OTHER SURGICAL  2019    Basal cell removal on face    HX TONSILLECTOMY  1975    HX WISDOM TEETH EXTRACTION  1988      Social History     Tobacco Use    Smoking status: Never Smoker    Smokeless tobacco: Never Used   Substance Use Topics    Alcohol use: Yes     Comment: rare      Family History   Problem Relation Age of Onset    Heart Disease Mother     Cancer Mother     Heart Disease Father     Hypertension Father     Dementia Father     Cancer Maternal Grandfather         Allergies   Allergen Reactions    Omnicef [Cefdinir] Hives        Assessment/Plan  Diagnoses and all orders for this visit:    1. Sinusitis, unspecified chronicity, unspecified location -will place on doxycycline. Patient is seeing me next week for physical.  We can place him on a short course of prednisone if necessary.  -     doxycycline (ADOXA) 100 mg tablet; Take 1 Tab by mouth two (2) times a day for 10 days. Yasmani Gibbs MD  1/9/2020    This note was created with the help of speech recognition software Jerrell Aase) and may contain some 'sound alike' errors.

## 2020-01-09 NOTE — PATIENT INSTRUCTIONS
Sinusitis: Care Instructions  Your Care Instructions    Sinusitis is an infection of the lining of the sinus cavities in your head. Sinusitis often follows a cold. It causes pain and pressure in your head and face. In most cases, sinusitis gets better on its own in 1 to 2 weeks. But some mild symptoms may last for several weeks. Sometimes antibiotics are needed. Follow-up care is a key part of your treatment and safety. Be sure to make and go to all appointments, and call your doctor if you are having problems. It's also a good idea to know your test results and keep a list of the medicines you take. How can you care for yourself at home? · Take an over-the-counter pain medicine, such as acetaminophen (Tylenol), ibuprofen (Advil, Motrin), or naproxen (Aleve). Read and follow all instructions on the label. · If the doctor prescribed antibiotics, take them as directed. Do not stop taking them just because you feel better. You need to take the full course of antibiotics. · Be careful when taking over-the-counter cold or flu medicines and Tylenol at the same time. Many of these medicines have acetaminophen, which is Tylenol. Read the labels to make sure that you are not taking more than the recommended dose. Too much acetaminophen (Tylenol) can be harmful. · Breathe warm, moist air from a steamy shower, a hot bath, or a sink filled with hot water. Avoid cold, dry air. Using a humidifier in your home may help. Follow the directions for cleaning the machine. · Use saline (saltwater) nasal washes to help keep your nasal passages open and wash out mucus and bacteria. You can buy saline nose drops at a grocery store or drugstore. Or you can make your own at home by adding 1 teaspoon of salt and 1 teaspoon of baking soda to 2 cups of distilled water. If you make your own, fill a bulb syringe with the solution, insert the tip into your nostril, and squeeze gently. Toby Bees your nose.   · Put a hot, wet towel or a warm gel pack on your face 3 or 4 times a day for 5 to 10 minutes each time. · Try a decongestant nasal spray like oxymetazoline (Afrin). Do not use it for more than 3 days in a row. Using it for more than 3 days can make your congestion worse. When should you call for help? Call your doctor now or seek immediate medical care if:    · You have new or worse swelling or redness in your face or around your eyes.     · You have a new or higher fever.    Watch closely for changes in your health, and be sure to contact your doctor if:    · You have new or worse facial pain.     · The mucus from your nose becomes thicker (like pus) or has new blood in it.     · You are not getting better as expected. Where can you learn more? Go to http://jennifer-annabelle.info/. Enter P469 in the search box to learn more about \"Sinusitis: Care Instructions. \"  Current as of: October 21, 2018  Content Version: 12.2  © 3043-0302 [employer]orporated. Care instructions adapted under license by [employer] (which disclaims liability or warranty for this information). If you have questions about a medical condition or this instruction, always ask your healthcare professional. [address] any warranty or liability for your use of this information.

## 2020-01-13 ENCOUNTER — OFFICE VISIT (OUTPATIENT)
Dept: INTERNAL MEDICINE CLINIC | Age: 50
End: 2020-01-13

## 2020-01-13 VITALS
HEIGHT: 70 IN | SYSTOLIC BLOOD PRESSURE: 116 MMHG | TEMPERATURE: 98.4 F | RESPIRATION RATE: 16 BRPM | OXYGEN SATURATION: 98 % | HEART RATE: 68 BPM | DIASTOLIC BLOOD PRESSURE: 80 MMHG | BODY MASS INDEX: 32.64 KG/M2 | WEIGHT: 228 LBS

## 2020-01-13 DIAGNOSIS — I10 ESSENTIAL HYPERTENSION: ICD-10-CM

## 2020-01-13 DIAGNOSIS — F43.29 ADJUSTMENT DISORDER WITH OTHER SYMPTOM: ICD-10-CM

## 2020-01-13 DIAGNOSIS — Z00.00 WELLNESS EXAMINATION: ICD-10-CM

## 2020-01-13 DIAGNOSIS — Z00.00 WELLNESS EXAMINATION: Primary | ICD-10-CM

## 2020-01-13 DIAGNOSIS — M54.2 NECK PAIN: ICD-10-CM

## 2020-01-13 DIAGNOSIS — M25.561 RIGHT KNEE PAIN, UNSPECIFIED CHRONICITY: ICD-10-CM

## 2020-01-13 RX ORDER — LISINOPRIL 5 MG/1
TABLET ORAL
Qty: 90 TAB | Refills: 2 | Status: SHIPPED | OUTPATIENT
Start: 2020-01-13 | End: 2020-12-18

## 2020-01-13 RX ORDER — MELOXICAM 15 MG/1
TABLET ORAL
Qty: 90 TAB | Refills: 1 | Status: SHIPPED | OUTPATIENT
Start: 2020-01-13 | End: 2021-02-08

## 2020-01-13 NOTE — PROGRESS NOTES
Flaca Cintron is a 47 y.o. male who presents today for Complete Physical  .      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 Today patient is here for complete physical exam..     Currently being treated for sinus infection. Overall he Stone feels better. Some mild cough. Patient is now completely off an SSRI. Notes that right now not enjoying his job. Still enjoys things and looks forward to things. He will see how things go with his work. He continues to take meloxicam for his knee. Pain is been slightly increased recently but has been more physically active. Hypertension - on low dose ACEi. Hypertension ROS: taking medications as instructed, no medication side effects noted, no TIA's, no chest pain on exertion, no dyspnea on exertion, no swelling of ankles     reports that he has never smoked. He has never used smokeless tobacco.    reports current alcohol use. BP Readings from Last 2 Encounters:   01/13/20 116/80   01/09/20 112/80       Health maintenance hx includes:  Exercise: moderately active. Form of exercise: Cardio and start and increase this. Diet:Eating better. Social: working in [address] [place] [address], now at a [address]. At home with wife and 2 children    Cancer screening:    Colon cancer screening:  Last Colonoscopy: Due   Prostate cancer screening:Due    Immunizations:     Immunization History   Administered Date(s) Administered    Influenza Vaccine (Quad) PF 10/29/2019      Immunization status: up to date and documented. ROS  Review of Systems   Constitutional: Negative for chills, fever and weight loss. HENT: Negative for congestion and sore throat. Eyes: Negative for blurred vision, double vision and photophobia. Respiratory: Negative for cough and shortness of breath. Cardiovascular: Negative for chest pain, palpitations and leg swelling.    Gastrointestinal: Negative for abdominal pain, constipation, diarrhea, heartburn, nausea and vomiting. Genitourinary: Negative for dysuria, frequency and urgency. Musculoskeletal: Negative for joint pain and myalgias. Skin: Negative for rash. Neurological: Negative. Negative for headaches. Endo/Heme/Allergies: Does not bruise/bleed easily. Psychiatric/Behavioral: Negative for memory loss and suicidal ideas. Visit Vitals  /80 (BP 1 Location: Left arm, BP Patient Position: Sitting)   Pulse 68   Temp 98.4 °F (36.9 °C) (Oral)   Resp 16   Ht 5' 10\" (1.778 m)   Wt 228 lb (103.4 kg)   SpO2 98%   BMI 32.71 kg/m²       Physical Exam  Constitutional:       Appearance: He is well-developed. He is not diaphoretic. HENT:      Head: Normocephalic and atraumatic. Eyes:      Pupils: Pupils are equal, round, and reactive to light. Neck:      Musculoskeletal: Normal range of motion and neck supple. Vascular: No JVD. Cardiovascular:      Rate and Rhythm: Normal rate and regular rhythm. Heart sounds: No murmur. Pulmonary:      Effort: Pulmonary effort is normal. No respiratory distress. Breath sounds: Normal breath sounds. No wheezing. Abdominal:      General: Bowel sounds are normal. There is no distension. Palpations: Abdomen is soft. Tenderness: There is no tenderness. Musculoskeletal: Normal range of motion. Skin:     General: Skin is warm and dry. Comments: Recently treated for 2 basal cell carcinomas. Seeing dermatologist regularly. Neurological:      Mental Status: He is alert and oriented to person, place, and time. Cranial Nerves: No cranial nerve deficit.    Psychiatric:         Behavior: Behavior normal.           Current Outpatient Medications   Medication Sig    lisinopril (PRINIVIL, ZESTRIL) 5 mg tablet TAKE 1 TABLET BY MOUTH EVERY DAY    meloxicam (MOBIC) 15 mg tablet TAKE 1 TABLET DAILY    guaiFENesin ER (MUCINEX) 600 mg ER tablet Take 600 mg by mouth two (2) times a day.  doxycycline (ADOXA) 100 mg tablet Take 1 Tab by mouth two (2) times a day for 10 days.  Phenylephrine-DM-Acetaminophen (TYLENOL COLD MULTI-SYMPTOM DAY) 5- mg/15 mL liqd Take  by mouth. No current facility-administered medications for this visit. Past Medical History:   Diagnosis Date    Hypertension       Past Surgical History:   Procedure Laterality Date    HX MENISCUS REPAIR Left 2014    HX OTHER SURGICAL  2011    Rectal becker    HX OTHER SURGICAL  2019    Basal cell removal on face    HX TONSILLECTOMY  1975    HX WISDOM TEETH EXTRACTION  1988      Social History     Tobacco Use    Smoking status: Never Smoker    Smokeless tobacco: Never Used   Substance Use Topics    Alcohol use: Yes     Comment: rare      Family History   Problem Relation Age of Onset    Heart Disease Mother     Cancer Mother     Heart Disease Father     Hypertension Father     Dementia Father     Cancer Maternal Grandfather         Allergies   Allergen Reactions    Omnicef [Cefdinir] Hives        Assessment/Plan  Diagnoses and all orders for this visit:    1. Wellness examination -patient due for both colonoscopy and prostate cancer screening. Blessing Gutierrez was counseled on age-appropriate/ guideline-based risk prevention behaviors and screening for a 47y.o. year old   male . We also discussed adjustments in screening based on family history if necessary. Printed instructions for preventative screening guidelines and healthy behaviors given to patient with after visit summary.    -     PSA W/ REFLX FREE PSA; Future  -     LIPID PANEL; Future  -     REFERRAL TO GASTROENTEROLOGY    2. Essential hypertension-well-controlled with current therapy. -     LIPID PANEL;  Future  -     METABOLIC PANEL, BASIC; Future  -     lisinopril (PRINIVIL, ZESTRIL) 5 mg tablet; TAKE 1 TABLET BY MOUTH EVERY DAY    3. Right knee pain, unspecified chronicity-continue PRN Mobic.  -     meloxicam (MOBIC) 15 mg tablet; TAKE 1 TABLET DAILY    4. Adjustment disorder with other symptom-notes that mood has been a bit worse recently though he does not enjoy his new job. I discussed that given I do not believe that this is recurrent depression as he is still very active and enjoys other aspects of his life. Patient will see how things go over the next couple months. 5. Neck pain  -     meloxicam (MOBIC) 15 mg tablet; TAKE 1 TABLET DAILY        Follow-up and Dispositions    · Return in about 6 months (around 7/13/2020), or if symptoms worsen or fail to improve. Jules Driver MD  1/13/2020    This note was created with the help of speech recognition software Kika Headley) and may contain some 'sound alike' errors. Discussed the patient's BMI with him. The BMI follow up plan is as follows:     dietary management education, guidance, and counseling  encourage exercise  monitor weight  prescribed dietary intake    An After Visit Summary was printed and given to the patient.

## 2020-01-13 NOTE — PATIENT INSTRUCTIONS
Body Mass Index: Care Instructions  Your Care Instructions    Body mass index (BMI) can help you see if your weight is raising your risk for health problems. It uses a formula to compare how much you weigh with how tall you are. · A BMI lower than 18.5 is considered underweight. · A BMI between 18.5 and 24.9 is considered healthy. · A BMI between 25 and 29.9 is considered overweight. A BMI of 30 or higher is considered obese. If your BMI is in the normal range, it means that you have a lower risk for weight-related health problems. If your BMI is in the overweight or obese range, you may be at increased risk for weight-related health problems, such as high blood pressure, heart disease, stroke, arthritis or joint pain, and diabetes. If your BMI is in the underweight range, you may be at increased risk for health problems such as fatigue, lower protection (immunity) against illness, muscle loss, bone loss, hair loss, and hormone problems. BMI is just one measure of your risk for weight-related health problems. You may be at higher risk for health problems if you are not active, you eat an unhealthy diet, or you drink too much alcohol or use tobacco products. Follow-up care is a key part of your treatment and safety. Be sure to make and go to all appointments, and call your doctor if you are having problems. It's also a good idea to know your test results and keep a list of the medicines you take. How can you care for yourself at home? · Practice healthy eating habits. This includes eating plenty of fruits, vegetables, whole grains, lean protein, and low-fat dairy. · If your doctor recommends it, get more exercise. Walking is a good choice. Bit by bit, increase the amount you walk every day. Try for at least 30 minutes on most days of the week. · Do not smoke. Smoking can increase your risk for health problems. If you need help quitting, talk to your doctor about stop-smoking programs and medicines. These can increase your chances of quitting for good. · Limit alcohol to 2 drinks a day for men and 1 drink a day for women. Too much alcohol can cause health problems. If you have a BMI higher than 25  · Your doctor may do other tests to check your risk for weight-related health problems. This may include measuring the distance around your waist. A waist measurement of more than 40 inches in men or 35 inches in women can increase the risk of weight-related health problems. · Talk with your doctor about steps you can take to stay healthy or improve your health. You may need to make lifestyle changes to lose weight and stay healthy, such as changing your diet and getting regular exercise. If you have a BMI lower than 18.5  · Your doctor may do other tests to check your risk for health problems. · Talk with your doctor about steps you can take to stay healthy or improve your health. You may need to make lifestyle changes to gain or maintain weight and stay healthy, such as getting more healthy foods in your diet and doing exercises to build muscle. Where can you learn more? Go to http://jennifer-annabelle.info/. Enter S176 in the search box to learn more about \"Body Mass Index: Care Instructions. \"  Current as of: October 13, 2016  Content Version: 11.4  © 8158-1713 [employer] Incorporated. Care instructions adapted under license by [employer] (which disclaims liability or warranty for this information). If you have questions about a medical condition or this instruction, always ask your healthcare professional. [place] [address] any warranty or liability for your use of this information. Well Visit, Men 48 to 72: Care Instructions  Your Care Instructions    Physical exams can help you stay healthy. Your doctor has checked your overall health and may have suggested ways to take good care of yourself. He or she also may have recommended tests.  At home, you can help prevent illness with healthy eating, regular exercise, and other steps. Follow-up care is a key part of your treatment and safety. Be sure to make and go to all appointments, and call your doctor if you are having problems. It's also a good idea to know your test results and keep a list of the medicines you take. How can you care for yourself at home? · Reach and stay at a healthy weight. This will lower your risk for many problems, such as obesity, diabetes, heart disease, and high blood pressure. · Get at least 30 minutes of exercise on most days of the week. Walking is a good choice. You also may want to do other activities, such as running, swimming, cycling, or playing tennis or team sports. · Do not smoke. Smoking can make health problems worse. If you need help quitting, talk to your doctor about stop-smoking programs and medicines. These can increase your chances of quitting for good. · Protect your skin from too much sun. When you're outdoors from 10 a.m. to 4 p.m., stay in the shade or cover up with clothing and a hat with a wide brim. Wear sunglasses that block UV rays. Even when it's cloudy, put broad-spectrum sunscreen (SPF 30 or higher) on any exposed skin. · See a dentist one or two times a year for checkups and to have your teeth cleaned. · Wear a seat belt in the car. Follow your doctor's advice about when to have certain tests. These tests can spot problems early. · Cholesterol. Your doctor will tell you how often to have this done based on your overall health and other things that can increase your risk for heart attack and stroke. · Blood pressure. Have your blood pressure checked during a routine doctor visit. Your doctor will tell you how often to check your blood pressure based on your age, your blood pressure results, and other factors. · Prostate exam. Talk to your doctor about whether you should have a blood test (called a PSA test) for prostate cancer.  Experts recommend that you discuss the benefits and risks of the test with your doctor before you decide whether to have this test.  · Diabetes. Ask your doctor whether you should have tests for diabetes. · Vision. Some experts recommend that you have yearly exams for glaucoma and other age-related eye problems starting at age 48. · Hearing. Tell your doctor if you notice any change in your hearing. You can have tests to find out how well you hear. · Colorectal cancer. Your risk for colorectal cancer gets higher as you get older. Some experts say that adults should start regular screening at age 48 and stop at age 76. Others say to start before age 48 or continue after age 76. Talk with your doctor about your risk and when to start and stop screening. · Heart attack and stroke risk. At least every 4 to 6 years, you should have your risk for heart attack and stroke assessed. Your doctor uses factors such as your age, blood pressure, cholesterol, and whether you smoke or have diabetes to show what your risk for a heart attack or stroke is over the next 10 years. · Abdominal aortic aneurysm. Ask your doctor whether you should have a test to check for an aneurysm. You may need a test if you ever smoked or if your parent, brother, sister, or child has had an aneurysm. When should you call for help? Watch closely for changes in your health, and be sure to contact your doctor if you have any problems or symptoms that concern you. Where can you learn more? Go to http://jennifer-annabelle.info/. Enter B681 in the search box to learn more about \"Well Visit, Men 48 to 72: Care Instructions. \"  Current as of: December 13, 2018  Content Version: 12.2  © 0161-5513 [employer]. Care instructions adapted under license by [employer] (which disclaims liability or warranty for this information).  If you have questions about a medical condition or this instruction, always ask your healthcare professional. [employer] disclaims any warranty or liability for your use of this information.

## 2020-01-13 NOTE — PROGRESS NOTES
Colonoscopy: Pt has not had c-scope yet, but has been educated on the procedure.     Shingles vaccine: Pt has been educated on new shingrix and where to obtain inj.

## 2020-01-14 LAB
BUN SERPL-MCNC: 15 MG/DL (ref 6–24)
BUN/CREAT SERPL: 14 (ref 9–20)
CALCIUM SERPL-MCNC: 9.6 MG/DL (ref 8.7–10.2)
CHLORIDE SERPL-SCNC: 101 MMOL/L (ref 96–106)
CHOLEST SERPL-MCNC: 218 MG/DL (ref 100–199)
CO2 SERPL-SCNC: 23 MMOL/L (ref 20–29)
CREAT SERPL-MCNC: 1.07 MG/DL (ref 0.76–1.27)
GLUCOSE SERPL-MCNC: 84 MG/DL (ref 65–99)
HDLC SERPL-MCNC: 48 MG/DL
INTERPRETATION, 910389: NORMAL
LDLC SERPL CALC-MCNC: 141 MG/DL (ref 0–99)
POTASSIUM SERPL-SCNC: 4.4 MMOL/L (ref 3.5–5.2)
PSA SERPL-MCNC: 0.5 NG/ML (ref 0–4)
REFLEX CRITERIA: NORMAL
SODIUM SERPL-SCNC: 137 MMOL/L (ref 134–144)
TRIGL SERPL-MCNC: 144 MG/DL (ref 0–149)
VLDLC SERPL CALC-MCNC: 29 MG/DL (ref 5–40)

## 2020-12-18 DIAGNOSIS — I10 ESSENTIAL HYPERTENSION: ICD-10-CM

## 2020-12-18 RX ORDER — LISINOPRIL 5 MG/1
TABLET ORAL
Qty: 90 TAB | Refills: 2 | Status: SHIPPED | OUTPATIENT
Start: 2020-12-18 | End: 2021-12-28

## 2021-02-08 DIAGNOSIS — M54.2 NECK PAIN: ICD-10-CM

## 2021-02-08 DIAGNOSIS — M25.561 RIGHT KNEE PAIN, UNSPECIFIED CHRONICITY: ICD-10-CM

## 2021-02-08 RX ORDER — MELOXICAM 15 MG/1
TABLET ORAL
Qty: 90 TAB | Refills: 1 | Status: SHIPPED | OUTPATIENT
Start: 2021-02-08 | End: 2022-01-23

## 2021-02-10 ENCOUNTER — NURSE TRIAGE (OUTPATIENT)
Dept: OTHER | Facility: CLINIC | Age: 51
End: 2021-02-10

## 2021-02-10 ENCOUNTER — TELEPHONE (OUTPATIENT)
Dept: INTERNAL MEDICINE CLINIC | Age: 51
End: 2021-02-10

## 2021-02-10 NOTE — TELEPHONE ENCOUNTER
----- Message from Yon Silva sent at 2/10/2021  2:11 PM EST -----  Regarding: Dr Luke Alexander Message/Vendor Calls    Caller's first and last name:      Reason for call: Pt was transferred by [facility]VIS, Triage Nurse. Pt is experiencing symptoms of scratchy throat, headache off and on for about a week. Pt's wife and daughter were both tested positive for COVID19 yesterday, but pt pt was tested negative.  Pt is requesting medication to alleviate his symptoms called in to [facility]      Callback required yes/no and why: yes      Best contact number(s): [phone]      Details to clarify the request:      Yon Silva

## 2021-02-10 NOTE — TELEPHONE ENCOUNTER
Reason for Disposition   [1] COVID-19 infection suspected by caller or triager AND [2] mild symptoms (cough, fever, or others) AND [6] no complications or SOB    Answer Assessment - Initial Assessment Questions  1. COVID-19 DIAGNOSIS: \"Who made your Coronavirus (COVID-19) diagnosis? \" \"Was it confirmed by a positive lab test?\" If not diagnosed by a HCP, ask \"Are there lots of cases (community spread) where you live? \" (See public health department website, if unsure)      Two family members positive yesterday    2. COVID-19 EXPOSURE: \"Was there any known exposure to COVID before the symptoms began? \" CDC Definition of close contact: within 6 feet (2 meters) for a total of 15 minutes or more over a 24-hour period. Wife and daughter diagnosed yesterday. Also dx with sinus infections      3. ONSET: \"When did the COVID-19 symptoms start? \"      One week ago    4. WORST SYMPTOM: \"What is your worst symptom? \" (e.g., cough, fever, shortness of breath, muscle aches)      Facial pain    5. COUGH: \"Do you have a cough? \" If so, ask: \"How bad is the cough? \"        Slight cough-- random, a few times a day    6. FEVER: \"Do you have a fever? \" If so, ask: \"What is your temperature, how was it measured, and when did it start? \"      No fever    7. RESPIRATORY STATUS: \"Describe your breathing? \" (e.g., shortness of breath, wheezing, unable to speak)       No SOB    8. BETTER-SAME-WORSE: Patrick Harris you getting better, staying the same or getting worse compared to yesterday? \"  If getting worse, ask, \"In what way? \"      Worse    9. HIGH RISK DISEASE: \"Do you have any chronic medical problems? \" (e.g., asthma, heart or lung disease, weak immune system, obesity, etc.)      No    10. PREGNANCY: \"Is there any chance you are pregnant? \" \"When was your last menstrual period? \"        No    11. OTHER SYMPTOMS: \"Do you have any other symptoms? \"  (e.g., chills, fatigue, headache, loss of smell or taste, muscle pain, sore throat; new loss of smell or taste especially support the diagnosis of COVID-19)        Sore throat, stuffy nose    Protocols used: CORONAVIRUS (COVID-19) DIAGNOSED OR SUSPECTED-ADULT-    Patient called [employer] with red flag complaint. Call received from [address] description of triage: facial pain, stuffy nose, sore throat x 1 week. Wife and daughter diagnosed with covid and sinus infections yesterday. Pt covid negative today--rapid test only. Triage indicates for patient to call PCP office when open    Care advice provided, patient verbalizes understanding; denies any other questions or concerns; instructed to call back for any new or worsening symptoms. Writer provided warm transfer to [place] [facility] for appointment scheduling. Attention Provider: Thank you for allowing me to participate in the care of your patient. The patient was connected to triage in response to information from calling the [employer]. Please do not respond through this encounter as the response is not directed to a shared pool.

## 2021-02-11 ENCOUNTER — VIRTUAL VISIT (OUTPATIENT)
Dept: INTERNAL MEDICINE CLINIC | Age: 51
End: 2021-02-11
Payer: COMMERCIAL

## 2021-02-11 DIAGNOSIS — I10 ESSENTIAL HYPERTENSION: Primary | ICD-10-CM

## 2021-02-11 PROCEDURE — 99214 OFFICE O/P EST MOD 30 MIN: CPT | Performed by: INTERNAL MEDICINE

## 2021-02-11 RX ORDER — AZITHROMYCIN 250 MG/1
250 TABLET, FILM COATED ORAL SEE ADMIN INSTRUCTIONS
Qty: 6 TAB | Refills: 0 | Status: SHIPPED | OUTPATIENT
Start: 2021-02-11 | End: 2021-02-16

## 2021-02-11 NOTE — PROGRESS NOTES
Everton Singh (: 1970) is a 45 y.o. male, established patient, here for evaluation of the following chief complaint(s):   Sinus Infection       ASSESSMENT/PLAN:  1. Essential hypertension  BP is at goal. I do not recommend any change in medications. Not at goal. Rx Z-Mazin for potential sinus infection. Had conversation with pt that he could have had a false negative. Discussed with pt that sinus sxs with HA are very common with COVID. Informed pt that if he develops SOB, chest tightness, or inability to eat or drink then I want him to contact the office. Discussed with pt that I want him to quarantine since he likely does have COVID. Will continue to monitor for improvements or changes. No follow-ups on file. SUBJECTIVE/OBJECTIVE:  HPI  Pt reports that his daughter and wife are positive for COVID. He notes that he tested negative with rapid test yesterday. He notes that he now has severe HA and sinus sxs and suspects that he is COVID positive. Pt reports that he now has sore throat and ear pain. He notes no relief with OTC medications (Sudafed, Tylenol, Nyquil). Review of Systems   HENT: Positive for ear pain, sinus pain and sore throat. Neurological: Positive for headaches. All other systems reviewed and are negative. No flowsheet data found. Physical Exam  Constitutional:       Appearance: Normal appearance. HENT:      Head: Normocephalic and atraumatic. Nose: Nose normal.      Mouth/Throat:      Mouth: Mucous membranes are moist.   Eyes:      Extraocular Movements: Extraocular movements intact. Neck:      Musculoskeletal: Normal range of motion. Pulmonary:      Effort: Pulmonary effort is normal.   Musculoskeletal: Normal range of motion. Neurological:      Mental Status: He is oriented to person, place, and time.    Psychiatric:         Mood and Affect: Mood normal.         Behavior: Behavior normal.               Everton Singh is being evaluated by a Virtual Visit (video visit) encounter to address concerns as mentioned above. A caregiver was present when appropriate. Due to this being a TeleHealth encounter (During AAR-48 public health emergency), evaluation of the following organ systems was limited: Vitals/Constitutional/EENT/Resp/CV/GI//MS/Neuro/Skin/Heme-Lymph-Imm. Pursuant to the emergency declaration under the [address] [place] [address] and the [employer] and [employer] Act, this Virtual Visit was conducted with patient's (and/or legal guardian's) consent, to reduce the patient's risk of exposure to COVID-19 and provide necessary medical care. The patient (and/or legal guardian) has also been advised to contact this office for worsening conditions or problems, and seek emergency medical treatment and/or call 911 if deemed necessary. Patient identification was verified at the start of the visit: YES    Services were provided through a video synchronous discussion virtually to substitute for in-person clinic visit. Patient was located at home and provider was located in office or at home. An electronic signature was used to authenticate this note.   -- Helen Jackson

## 2021-10-01 ENCOUNTER — OFFICE VISIT (OUTPATIENT)
Dept: INTERNAL MEDICINE CLINIC | Age: 51
End: 2021-10-01
Payer: COMMERCIAL

## 2021-10-01 VITALS
TEMPERATURE: 98 F | HEART RATE: 75 BPM | WEIGHT: 222 LBS | OXYGEN SATURATION: 96 % | HEIGHT: 70 IN | DIASTOLIC BLOOD PRESSURE: 84 MMHG | RESPIRATION RATE: 14 BRPM | SYSTOLIC BLOOD PRESSURE: 122 MMHG | BODY MASS INDEX: 31.78 KG/M2

## 2021-10-01 DIAGNOSIS — Z83.3 FAMILY HISTORY OF DIABETES MELLITUS IN FATHER: ICD-10-CM

## 2021-10-01 DIAGNOSIS — R42 EPISODIC LIGHTHEADEDNESS: Primary | ICD-10-CM

## 2021-10-01 DIAGNOSIS — Z00.00 ROUTINE GENERAL MEDICAL EXAMINATION AT A HEALTH CARE FACILITY: ICD-10-CM

## 2021-10-01 PROCEDURE — 99214 OFFICE O/P EST MOD 30 MIN: CPT | Performed by: NURSE PRACTITIONER

## 2021-10-01 NOTE — PROGRESS NOTES
Fabiola Gandhi (: 1970) is a 45 y.o. male, established patient, here for evaluation of the following chief complaint(s):  Dizziness (feels lightheaded all day yesterday and sleeping more than usual - 2021 )       ASSESSMENT/PLAN:  Below is the assessment and plan developed based on review of pertinent history, physical exam, labs, studies, and medications. 1. Episodic lightheadedness -- etiology unclear. He is not orthostatic in office; increase fluids. No signs of any URI or neuro abnormalities. Encouraged to obtain home bp cuff and check blood pressures on a regular basis. Will not make any adjustments to his medication at this time.  -     METABOLIC PANEL, COMPREHENSIVE; Future  -     CBC WITH AUTOMATED DIFF; Future    2. Family history of diabetes mellitus in father  -     HEMOGLOBIN A1C WITH EAG; Future    3. Routine general medical examination at a health care facility - he is fasting at visit today and due for routine labs; advised to return to office for CPE with Dr Keon Russell, COMPREHENSIVE; Future  -     CBC WITH AUTOMATED DIFF; Future  -     LIPID PANEL; Future  -     PSA W/ REFLX FREE PSA; Future      Follow up if signs and symptoms worsen or change. After hours number given. SUBJECTIVE/OBJECTIVE:  HPI    Patient of Dr Blank Pereira who presents with complaints of lightheadedness that began yesterday morning and persisted throughout the day. Denies spinning or tilting sensation but felt strange. Had an episode of dry heaves after bending forward and felt more tired than normal.  Reports he took a longer nap yesterday and felt slightly better. Denies weakness, severe headache, slurring of speech, facial drooping. Denies head congestion, fever, chills, myalgias, cough, chest pressure/pain, shortness of breath. Heart rate remained in the 60's according to his wrist monitor and he denies racing heart or palpitations. Did a home Covid test which was negative.  Was feeling very thirsty and ate much more than normal.  Reports he woke this am and sensation was gone. Currently on low dose Lisinopril 5 mg daily. Does not have a blood pressure cuff at home. 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    Essential hypertension I10     Past Surgical History:   Procedure Laterality Date    HX MENISCUS REPAIR Left 2014    HX OTHER SURGICAL  2011    Rectal becker    HX OTHER SURGICAL  2019    Basal cell removal on face    HX TONSILLECTOMY  1975    HX WISDOM TEETH EXTRACTION  1988     Social History     Socioeconomic History    Marital status:      Spouse name: Not on file    Number of children: Not on file    Years of education: Not on file    Highest education level: Not on file   Occupational History    Not on file   Tobacco Use    Smoking status: Never Smoker    Smokeless tobacco: Never Used   Substance and Sexual Activity    Alcohol use: Yes     Comment: rare    Drug use: No    Sexual activity: Yes   Other Topics Concern    Not on file   Social History Narrative    Not on file     Social Determinants of Health     Financial Resource Strain:     Difficulty of Paying Living Expenses:    Food Insecurity:     Worried About Running Out of Food in the Last Year:     [address] in the Last Year:    Transportation Needs:     Lack of Transportation (Medical):      Lack of Transportation (Non-Medical):    Physical Activity:     Days of Exercise per Week:     Minutes of Exercise per Session:    Stress:     Feeling of Stress :    Social Connections:     Frequency of Communication with Friends and Family:     Frequency of Social Gatherings with Friends and Family:     Attends [employer]ah[employer] Services:     Active Member of Clubs or Organizations:     Attends Club or Organization Meetings:     Marital Status:    Intimate Partner Violence:     Fear of Current or Ex-Partner:     Emotionally Abused:  Physically Abused:     Sexually Abused:      Family History   Problem Relation Age of Onset    Heart Disease Mother     Cancer Mother     Heart Disease Father     Hypertension Father     Dementia Father     Cancer Maternal Grandfather      Current Outpatient Medications   Medication Sig    psyllium (METAMUCIL) packet Take 1 Packet by mouth nightly.  lisinopriL (PRINIVIL, ZESTRIL) 5 mg tablet TAKE 1 TABLET BY MOUTH EVERY DAY    meloxicam (MOBIC) 15 mg tablet TAKE 1 TABLET BY MOUTH EVERY DAY (Patient not taking: Reported on 10/1/2021)     No current facility-administered medications for this visit. Allergies   Allergen Reactions    Omnicef [Cefdinir] Hives     Immunization History   Administered Date(s) Administered    Influenza Vaccine bOombate) PF (>6 Mo Flulaval, Fluarix, and >3 Yrs Afluria, Fluzone 92377) 10/29/2019       Review of Systems   Constitutional: Positive for appetite change and fatigue. Negative for chills and fever. HENT: Negative for congestion, postnasal drip, sinus pressure and sore throat. Respiratory: Negative for cough and shortness of breath. Cardiovascular: Negative for chest pain, palpitations and leg swelling. Gastrointestinal: Positive for nausea and vomiting (dry heaves). Negative for abdominal pain and diarrhea. Endocrine: Positive for polydipsia and polyphagia. Genitourinary: Negative for dysuria, frequency and urgency. Musculoskeletal: Negative for arthralgias, gait problem and myalgias. Skin: Negative for rash. Neurological: Positive for light-headedness. Negative for dizziness, facial asymmetry, speech difficulty, weakness, numbness and headaches. Psychiatric/Behavioral: Negative for decreased concentration and dysphoric mood. The patient is nervous/anxious.         Orthostatic Blood Pressure:  Blood pressure:    lying 126/88, sitting 118/86, standing 116/80  Pulse:    lying 62, sitting 72, standing 79  Physical Exam  Vitals and nursing note reviewed. Constitutional:       General: He is not in acute distress. Appearance: Normal appearance. HENT:      Head: Normocephalic and atraumatic. Right Ear: Tympanic membrane, ear canal and external ear normal.      Left Ear: Tympanic membrane, ear canal and external ear normal.      Nose: Nose normal.      Mouth/Throat:      Mouth: Mucous membranes are moist.      Pharynx: Oropharynx is clear. Eyes:      Extraocular Movements: Extraocular movements intact. Cardiovascular:      Rate and Rhythm: Normal rate and regular rhythm. Pulmonary:      Effort: Pulmonary effort is normal.      Breath sounds: Normal breath sounds. No wheezing or rhonchi. Musculoskeletal:         General: Normal range of motion. Cervical back: Normal range of motion and neck supple. Skin:     General: Skin is warm and dry. Neurological:      General: No focal deficit present. Mental Status: He is alert and oriented to person, place, and time. Cranial Nerves: No cranial nerve deficit. Coordination: Coordination normal.      Gait: Gait normal.   Psychiatric:         Behavior: Behavior normal.           On this date 10/01/2021 I have spent 35 minutes reviewing previous notes, test results and face to face with the patient discussing the diagnosis and importance of compliance with the treatment plan as well as documenting on the day of the visit. An electronic signature was used to authenticate this note.   -- Kassandra Fernandez NP

## 2021-10-01 NOTE — PATIENT INSTRUCTIONS
Lightheadedness or Faintness: Care Instructions  Your Care Instructions  Lightheadedness is a feeling that you are about to faint or \"pass out. \" You do not feel as if you or your surroundings are moving. It is different from vertigo, which is the feeling that you or things around you are spinning or tilting. Lightheadedness usually goes away or gets better when you lie down. If lightheadedness gets worse, it can lead to a fainting spell. It is common to feel lightheaded from time to time. Lightheadedness usually is not caused by a serious problem. It often is caused by a short-lasting drop in blood pressure and blood flow to your head that occurs when you get up too quickly from a seated or lying position. Follow-up care is a key part of your treatment and safety. Be sure to make and go to all appointments, and call your doctor if you are having problems. It's also a good idea to know your test results and keep a list of the medicines you take. How can you care for yourself at home? · Lie down for 1 or 2 minutes when you feel lightheaded. After lying down, sit up slowly and remain sitting for 1 to 2 minutes before slowly standing up. · Avoid movements, positions, or activities that have made you lightheaded in the past.  · Get plenty of rest, especially if you have a cold or flu, which can cause lightheadedness. · Make sure you drink plenty of fluids, especially if you have a fever or have been sweating. · Do not drive or put yourself and others in danger while you feel lightheaded. When should you call for help? Call 911 anytime you think you may need emergency care. For example, call if:    · You have symptoms of a stroke. These may include:  ? Sudden numbness, tingling, weakness, or loss of movement in your face, arm, or leg, especially on only one side of your body. ? Sudden vision changes. ? Sudden trouble speaking. ? Sudden confusion or trouble understanding simple statements.   ? Sudden problems with walking or balance. ? A sudden, severe headache that is different from past headaches.     · You have symptoms of a heart attack. These may include:  ? Chest pain or pressure, or a strange feeling in the chest.  ? Sweating. ? Shortness of breath. ? Nausea or vomiting. ? Pain, pressure, or a strange feeling in the back, neck, jaw, or upper belly or in one or both shoulders or arms. ? Lightheadedness or sudden weakness. ? A fast or irregular heartbeat. After you call 911, the  may tell you to chew 1 adult-strength or 2 to 4 low-dose aspirin. Wait for an ambulance. Do not try to drive yourself. Watch closely for changes in your health, and be sure to contact your doctor if:    · Your lightheadedness gets worse or does not get better with home care. Where can you learn more? Go to http://www.gray.com/  Enter [id number] in the search box to learn more about \"Lightheadedness or Faintness: Care Instructions. \"  Current as of: July 1, 2021               Content Version: 13.0  © 5529-7339 [employer]. Care instructions adapted under license by [employer] (which disclaims liability or warranty for this information). If you have questions about a medical condition or this instruction, always ask your healthcare professional. [address] any warranty or liability for your use of this information.

## 2021-10-02 LAB
ALBUMIN SERPL-MCNC: 4.8 G/DL (ref 3.8–4.9)
ALBUMIN/GLOB SERPL: 2.7 {RATIO} (ref 1.2–2.2)
ALP SERPL-CCNC: 48 IU/L (ref 44–121)
ALT SERPL-CCNC: 26 IU/L (ref 0–44)
AST SERPL-CCNC: 19 IU/L (ref 0–40)
BASOPHILS # BLD AUTO: 0 X10E3/UL (ref 0–0.2)
BASOPHILS NFR BLD AUTO: 0 %
BILIRUB SERPL-MCNC: 0.7 MG/DL (ref 0–1.2)
BUN SERPL-MCNC: 15 MG/DL (ref 6–24)
BUN/CREAT SERPL: 16 (ref 9–20)
CALCIUM SERPL-MCNC: 9.4 MG/DL (ref 8.7–10.2)
CHLORIDE SERPL-SCNC: 101 MMOL/L (ref 96–106)
CHOLEST SERPL-MCNC: 221 MG/DL (ref 100–199)
CO2 SERPL-SCNC: 24 MMOL/L (ref 20–29)
CREAT SERPL-MCNC: 0.95 MG/DL (ref 0.76–1.27)
EOSINOPHIL # BLD AUTO: 0.1 X10E3/UL (ref 0–0.4)
EOSINOPHIL NFR BLD AUTO: 1 %
ERYTHROCYTE [DISTWIDTH] IN BLOOD BY AUTOMATED COUNT: 12.9 % (ref 11.6–15.4)
EST. AVERAGE GLUCOSE BLD GHB EST-MCNC: 114 MG/DL
GLOBULIN SER CALC-MCNC: 1.8 G/DL (ref 1.5–4.5)
GLUCOSE SERPL-MCNC: 81 MG/DL (ref 65–99)
HBA1C MFR BLD: 5.6 % (ref 4.8–5.6)
HCT VFR BLD AUTO: 49.5 % (ref 37.5–51)
HDLC SERPL-MCNC: 51 MG/DL
HGB BLD-MCNC: 17.5 G/DL (ref 13–17.7)
IMM GRANULOCYTES # BLD AUTO: 0 X10E3/UL (ref 0–0.1)
IMM GRANULOCYTES NFR BLD AUTO: 1 %
IMP & REVIEW OF LAB RESULTS: NORMAL
LDLC SERPL CALC-MCNC: 144 MG/DL (ref 0–99)
LYMPHOCYTES # BLD AUTO: 1.8 X10E3/UL (ref 0.7–3.1)
LYMPHOCYTES NFR BLD AUTO: 33 %
MCH RBC QN AUTO: 34.4 PG (ref 26.6–33)
MCHC RBC AUTO-ENTMCNC: 35.4 G/DL (ref 31.5–35.7)
MCV RBC AUTO: 97 FL (ref 79–97)
MONOCYTES # BLD AUTO: 0.4 X10E3/UL (ref 0.1–0.9)
MONOCYTES NFR BLD AUTO: 8 %
NEUTROPHILS # BLD AUTO: 3 X10E3/UL (ref 1.4–7)
NEUTROPHILS NFR BLD AUTO: 57 %
PLATELET # BLD AUTO: 216 X10E3/UL (ref 150–450)
POTASSIUM SERPL-SCNC: 4.6 MMOL/L (ref 3.5–5.2)
PROT SERPL-MCNC: 6.6 G/DL (ref 6–8.5)
PSA SERPL-MCNC: 0.6 NG/ML (ref 0–4)
RBC # BLD AUTO: 5.09 X10E6/UL (ref 4.14–5.8)
REFLEX CRITERIA: NORMAL
SODIUM SERPL-SCNC: 139 MMOL/L (ref 134–144)
TRIGL SERPL-MCNC: 147 MG/DL (ref 0–149)
VLDLC SERPL CALC-MCNC: 26 MG/DL (ref 5–40)
WBC # BLD AUTO: 5.3 X10E3/UL (ref 3.4–10.8)

## 2021-12-28 DIAGNOSIS — I10 ESSENTIAL HYPERTENSION: ICD-10-CM

## 2021-12-28 RX ORDER — LISINOPRIL 5 MG/1
TABLET ORAL
Qty: 90 TABLET | Refills: 2 | Status: SHIPPED | OUTPATIENT
Start: 2021-12-28 | End: 2022-10-02

## 2022-01-23 DIAGNOSIS — M25.561 RIGHT KNEE PAIN, UNSPECIFIED CHRONICITY: ICD-10-CM

## 2022-01-23 DIAGNOSIS — M54.2 NECK PAIN: ICD-10-CM

## 2022-01-23 RX ORDER — MELOXICAM 15 MG/1
TABLET ORAL
Qty: 90 TABLET | Refills: 1 | Status: SHIPPED | OUTPATIENT
Start: 2022-01-23 | End: 2022-10-31

## 2022-01-24 ENCOUNTER — TELEPHONE (OUTPATIENT)
Dept: INTERNAL MEDICINE CLINIC | Age: 52
End: 2022-01-24

## 2022-01-24 NOTE — TELEPHONE ENCOUNTER
Pt is calling wants to know if doctor has any recommendation for a therapist. Please call back and advise

## 2022-03-20 PROBLEM — I10 ESSENTIAL HYPERTENSION: Status: ACTIVE | Noted: 2018-03-07

## 2022-08-05 ENCOUNTER — VIRTUAL VISIT (OUTPATIENT)
Dept: INTERNAL MEDICINE CLINIC | Age: 52
End: 2022-08-05
Payer: COMMERCIAL

## 2022-08-05 DIAGNOSIS — J06.9 UPPER RESPIRATORY TRACT INFECTION, UNSPECIFIED TYPE: Primary | ICD-10-CM

## 2022-08-05 PROCEDURE — 99213 OFFICE O/P EST LOW 20 MIN: CPT | Performed by: INTERNAL MEDICINE

## 2022-08-05 RX ORDER — DICLOFENAC SODIUM 75 MG/1
75 TABLET, DELAYED RELEASE ORAL 2 TIMES DAILY
COMMUNITY
Start: 2022-07-29

## 2022-08-05 RX ORDER — AZITHROMYCIN 250 MG/1
TABLET, FILM COATED ORAL
Qty: 6 TABLET | Refills: 0 | Status: SHIPPED | OUTPATIENT
Start: 2022-08-05 | End: 2022-08-10

## 2022-08-05 NOTE — PROGRESS NOTES
Consent: Kiesha Cunha, who was seen by synchronous (real-time) audio-video technology, and/or his healthcare decision maker, is aware that this patient-initiated, Telehealth encounter on 8/5/2022 is a billable service, with coverage as determined by his insurance carrier. He is aware that he may receive a bill and has provided verbal consent to proceed: Yes. I was in the office while conducting this encounter. Patient identification was verified at the start of the visit: YES  This visit was done with doxy. me  The patient was located at home in a state where the provider was licensed to provide care. Patient was located at HOME      Note   Chief Complaint   Sore throat    Kiesha Cunha is a 45 y.o. male     1. Upper respiratory tract infection, unspecified type  -     azithromycin (ZITHROMAX) 250 mg tablet; Take 2 Tablets by mouth daily for 1 day, THEN 1 Tablet daily for 4 days. , Normal, Disp-6 Tablet, R-0   Woke up with drainage yesterday  Leaving town for vacation tomorrow- driving to [place]  Worsening symptoms today  Sore throat, chills   Difficulty sleeping  HA  Feels hot  +cough, no SOB   Zpack worked in the past and requesting prescription  Usually don't prescribe abx this early, but given vacation, zpack sent as requested. Advisedt o take COVID test and wear a mask. If test is negative, advise to rested in 2 days. call if symptoms don't improve        We discussed the expected course, resolution and complications of the diagnosis(es) in detail. Medication risks, benefits, costs, interactions, and alternatives were discussed as indicated. I advised him to contact the office if his condition worsens, changes or fails to improve as anticipated. He expressed understanding with the diagnosis(es) and plan. Return to clinic:  as needed    Fernanda Zarco MD  [facility]  8/5/2022    No future appointments.      Objective   Vitals:       Patient-Reported Vitals 8/5/2022 Patient-Reported Weight 210lb                 Physical Exam  Constitutional:       Appearance: Normal appearance. He is not ill-appearing. HENT:      Nose:      Comments: nasally voice  Pulmonary:      Effort: No respiratory distress. Neurological:      Mental Status: He is alert. Current Outpatient Medications   Medication Sig    diclofenac EC (VOLTAREN) 75 mg EC tablet Take 75 mg by mouth two (2) times a day. azithromycin (ZITHROMAX) 250 mg tablet Take 2 Tablets by mouth daily for 1 day, THEN 1 Tablet daily for 4 days. lisinopriL (PRINIVIL, ZESTRIL) 5 mg tablet TAKE 1 TABLET BY MOUTH EVERY DAY    psyllium (METAMUCIL) packet Take 1 Packet by mouth nightly. meloxicam (MOBIC) 15 mg tablet TAKE 1 TABLET BY MOUTH EVERY DAY (Patient not taking: Reported on 8/5/2022)     No current facility-administered medications for this visit. Everton Singh is a 45 y.o. male being evaluated by a video visit encounter for concerns as above. A caregiver was present when appropriate. Due to this being a TeleHealth encounter (During [facility]- public health emergency), evaluation of the following organ systems was limited: Vitals/Constitutional/EENT/Resp/CV/GI//MS/Neuro/Skin/Heme-Lymph-Imm. Pursuant to the emergency declaration under the [facility], 1135 waiver authority and the [employer] and [employer] Act, this Virtual  Visit was conducted, with patient's (and/or legal guardian's) consent, to reduce the patient's risk of exposure to COVID-19 and provide necessary medical care. Services were provided through a video synchronous discussion virtually to substitute for in-person clinic visit.

## 2022-09-16 ENCOUNTER — OFFICE VISIT (OUTPATIENT)
Dept: INTERNAL MEDICINE CLINIC | Age: 52
End: 2022-09-16
Payer: COMMERCIAL

## 2022-09-16 VITALS
WEIGHT: 215 LBS | BODY MASS INDEX: 30.78 KG/M2 | TEMPERATURE: 97.9 F | SYSTOLIC BLOOD PRESSURE: 133 MMHG | HEIGHT: 70 IN | HEART RATE: 73 BPM | DIASTOLIC BLOOD PRESSURE: 82 MMHG | RESPIRATION RATE: 14 BRPM | OXYGEN SATURATION: 97 %

## 2022-09-16 DIAGNOSIS — F43.29 ADJUSTMENT DISORDER WITH OTHER SYMPTOM: Primary | ICD-10-CM

## 2022-09-16 DIAGNOSIS — I10 ESSENTIAL HYPERTENSION: ICD-10-CM

## 2022-09-16 PROCEDURE — 99214 OFFICE O/P EST MOD 30 MIN: CPT | Performed by: INTERNAL MEDICINE

## 2022-09-16 RX ORDER — ESCITALOPRAM OXALATE 5 MG/1
5 TABLET ORAL DAILY
Qty: 30 TABLET | Refills: 1 | Status: SHIPPED | OUTPATIENT
Start: 2022-09-16 | End: 2022-09-16 | Stop reason: SDUPTHER

## 2022-09-16 RX ORDER — ESCITALOPRAM OXALATE 5 MG/1
5 TABLET ORAL DAILY
Qty: 30 TABLET | Refills: 1 | Status: SHIPPED | OUTPATIENT
Start: 2022-09-16 | End: 2022-10-10 | Stop reason: SDUPTHER

## 2022-09-16 RX ORDER — CLONAZEPAM 0.5 MG/1
0.5 TABLET ORAL
Qty: 10 TABLET | Refills: 0 | Status: SHIPPED | OUTPATIENT
Start: 2022-09-16

## 2022-09-16 RX ORDER — CLONAZEPAM 0.5 MG/1
0.5 TABLET ORAL
Qty: 10 TABLET | Refills: 0 | Status: SHIPPED | OUTPATIENT
Start: 2022-09-16 | End: 2022-09-16 | Stop reason: SDUPTHER
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
## 2022-10-02 DIAGNOSIS — I10 ESSENTIAL HYPERTENSION: ICD-10-CM

## 2022-10-02 RX ORDER — LISINOPRIL 5 MG/1
TABLET ORAL
Qty: 90 TABLET | Refills: 2 | Status: SHIPPED | OUTPATIENT
Start: 2022-10-02

## 2022-10-10 DIAGNOSIS — F43.29 ADJUSTMENT DISORDER WITH OTHER SYMPTOM: ICD-10-CM

## 2022-10-10 RX ORDER — ESCITALOPRAM OXALATE 5 MG/1
5 TABLET ORAL DAILY
Qty: 30 TABLET | Refills: 1 | Status: SHIPPED | OUTPATIENT
Start: 2022-10-10 | End: 2022-10-17 | Stop reason: SDUPTHER

## 2022-10-17 ENCOUNTER — VIRTUAL VISIT (OUTPATIENT)
Dept: INTERNAL MEDICINE CLINIC | Age: 52
End: 2022-10-17
Payer: COMMERCIAL

## 2022-10-17 DIAGNOSIS — F43.29 ADJUSTMENT DISORDER WITH OTHER SYMPTOM: ICD-10-CM

## 2022-10-17 PROCEDURE — 99213 OFFICE O/P EST LOW 20 MIN: CPT | Performed by: INTERNAL MEDICINE

## 2022-10-17 RX ORDER — ESCITALOPRAM OXALATE 5 MG/1
5 TABLET ORAL DAILY
Qty: 90 TABLET | Refills: 1 | Status: SHIPPED | OUTPATIENT
Start: 2022-10-17

## 2022-10-17 NOTE — PROGRESS NOTES
Sung Stern  Identified pt with two pt identifiers(name and ). Chief Complaint   Patient presents with    Medication Evaluation     VV// pt presenting today for medication evaluation; pt states medication is helping       1. Have you been to the ER, urgent care clinic since your last visit? Hospitalized since your last visit? NO    2. Have you seen or consulted any other health care providers outside of the [address] [place] [address] since your last visit? Include any pap smears or colon screening. NO      Provider notified of reason for visit, vitals and flowsheets obtained on patients. Patient received paperwork for advance directive during previous visit but has not completed at this time     Reviewed record In preparation for visit, huddled with provider and have obtained necessary documentation      Health Maintenance Due   Topic    Hepatitis C Screening     COVID-19 Vaccine (1)    Shingrix Vaccine Age 50> (1 of 2)    Flu Vaccine (1)       Wt Readings from Last 3 Encounters:   22 215 lb (97.5 kg)   10/01/21 222 lb (100.7 kg)   20 228 lb (103.4 kg)     Temp Readings from Last 3 Encounters:   22 97.9 °F (36.6 °C) (Oral)   10/01/21 98 °F (36.7 °C) (Oral)   20 98.4 °F (36.9 °C) (Oral)     BP Readings from Last 3 Encounters:   22 133/82   10/01/21 122/84   20 116/80     Pulse Readings from Last 3 Encounters:   22 73   10/01/21 75   20 68     There were no vitals filed for this visit.       Learning Assessment:  :     Learning Assessment 2017   PRIMARY LEARNER Patient   HIGHEST LEVEL OF EDUCATION - PRIMARY LEARNER  > 4 YEARS OF COLLEGE   BARRIERS PRIMARY LEARNER NONE   CO-LEARNER CAREGIVER No   PRIMARY LANGUAGE ENGLISH   LEARNER PREFERENCE PRIMARY LISTENING   ANSWERED BY patient   RELATIONSHIP SELF       Depression Screening:  :     3 most recent [facility] 36 Screens 2022   Little interest or pleasure in doing things Not at all   Feeling down, depressed, irritable, or hopeless Not at all   Total Score PHQ 2 0       Fall Risk Assessment:  :     Fall Risk Assessment, last 12 mths 1/13/2020   Able to walk? Yes   Fall in past 12 months? No       Abuse Screening:  :     Abuse Screening Questionnaire 1/13/2020 1/9/2020 2/6/2019 8/14/2018 7/31/2017   Do you ever feel afraid of your partner? N N N N N   Are you in a relationship with someone who physically or mentally threatens you? N N N N N   Is it safe for you to go home? Y Y Y Y Y       ADL Screening:  :     No flowsheet data found. Medication reconciliation up to date and corrected with patient at this time.

## 2022-10-17 NOTE — PROGRESS NOTES
Milady Montgomery was seen on 10/17/2022 using synchronous (real-time) audio-video technology; doxy. me. Consent: Milady Montgomery, who was seen by synchronous (real-time) audio-video technology, and/or his healthcare decision maker, is aware that this patient-initiated, Telehealth encounter on 10/17/2022 is a billable service, with coverage as determined by his insurance carrier. He is aware that he may receive a bill and has provided verbal consent to proceed: Yes. I was in the office while conducting this encounter. Milady Montgomery is a 47 y.o. male who presents today for Medication Evaluation (VV// pt presenting today for medication evaluation; pt states medication is helping)  . 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 Today patient is being seen for follow-up. Adjustment disorder: At last visit patient was going through a lot with his father who had been living with them for some time. His father has pretty significant and progressive dementia. There had been 1 acute stressful event which led to his father moving in with his brother. We had resumed Lexapro, which he has taken in the past.   Since patient reports that he is feeling a bit better. Still with some depression, but overall improved. We discussed increasing dose, he would like to keep at the same dose for the time being. Still seeing therapist.     Weight seems to have remained stable. He is staying physically active. ROS  Review of Systems   Constitutional:  Negative for chills, fever and weight loss. HENT:  Negative for congestion and sore throat. Eyes:  Negative for blurred vision, double vision and photophobia. Respiratory:  Negative for cough and shortness of breath. Cardiovascular:  Negative for chest pain, palpitations and leg swelling.    Gastrointestinal:  Negative for abdominal pain, constipation, diarrhea, heartburn, nausea and vomiting. Genitourinary:  Negative for dysuria, frequency and urgency. Musculoskeletal:  Negative for joint pain and myalgias. Skin:  Negative for rash. Neurological: Negative. Negative for headaches. Endo/Heme/Allergies:  Does not bruise/bleed easily. Psychiatric/Behavioral:  Positive for depression. Negative for memory loss and suicidal ideas. The patient is nervous/anxious. There were no vitals taken for this visit. Patient-Reported Vitals 10/17/2022   Patient-Reported Weight 215        Physical Exam      Current Outpatient Medications   Medication Sig    escitalopram oxalate (LEXAPRO) 5 mg tablet Take 1 Tablet by mouth daily. lisinopriL (PRINIVIL, ZESTRIL) 5 mg tablet TAKE 1 TABLET BY MOUTH EVERY DAY    clonazePAM (KlonoPIN) 0.5 mg tablet Take 1 Tablet by mouth two (2) times daily as needed for Anxiety. Max Daily Amount: 1 mg.    diclofenac EC (VOLTAREN) 75 mg EC tablet Take 75 mg by mouth two (2) times a day. meloxicam (MOBIC) 15 mg tablet TAKE 1 TABLET BY MOUTH EVERY DAY    psyllium (METAMUCIL) packet Take 1 Packet by mouth nightly. No current facility-administered medications for this visit.         Past Medical History:   Diagnosis Date    Hypertension       Past Surgical History:   Procedure Laterality Date    HX MENISCUS REPAIR Left 2014    HX OTHER SURGICAL  2011    Rectal becker    HX OTHER SURGICAL  2019    Basal cell removal on face    HX TONSILLECTOMY  1975    HX WISDOM TEETH EXTRACTION  1988      Social History     Tobacco Use    Smoking status: Never    Smokeless tobacco: Never   Substance Use Topics    Alcohol use: Yes     Comment: rare      Family History   Problem Relation Age of Onset    Heart Disease Mother     Cancer Mother     Heart Disease Father     Hypertension Father     Dementia Father     Cancer Maternal Grandfather         Allergies   Allergen Reactions    Omnicef [Cefdinir] Hives Assessment/Plan  Diagnoses and all orders for this visit:    1. Adjustment disorder with other symptom- continue current dose. Urge to continue for 3-6 months and then can consider weaning off.   -     escitalopram oxalate (LEXAPRO) 5 mg tablet; Take 1 Tablet by mouth daily. Baby Carrier, was evaluated through a synchronous (real-time) audio-video encounter. The patient (or guardian if applicable) is aware that this is a billable service, which includes applicable co-pays. This Virtual Visit was conducted with patient's (and/or legal guardian's) consent. The visit was conducted pursuant to the emergency declaration under the [facility], [address] [place] [address] authority and the [employer] Act. Patient identification was verified, and a caregiver was present when appropriate. The patient was located in a state where the provider was licensed to provide care. Cheo Huggins MD  10/17/2022    This note was created with the help of speech recognition software Mario Solorzano) and may contain some 'sound alike' errors.

## 2022-10-31 DIAGNOSIS — M25.561 RIGHT KNEE PAIN, UNSPECIFIED CHRONICITY: ICD-10-CM

## 2022-10-31 DIAGNOSIS — M54.2 NECK PAIN: ICD-10-CM

## 2022-10-31 RX ORDER — MELOXICAM 15 MG/1
TABLET ORAL
Qty: 90 TABLET | Refills: 1 | Status: SHIPPED | OUTPATIENT
Start: 2022-10-31

## 2022-11-10 ENCOUNTER — OFFICE VISIT (OUTPATIENT)
Dept: INTERNAL MEDICINE CLINIC | Age: 52
End: 2022-11-10
Payer: COMMERCIAL

## 2022-11-10 VITALS
HEART RATE: 64 BPM | RESPIRATION RATE: 16 BRPM | HEIGHT: 70 IN | TEMPERATURE: 97.9 F | WEIGHT: 218 LBS | DIASTOLIC BLOOD PRESSURE: 81 MMHG | SYSTOLIC BLOOD PRESSURE: 124 MMHG | BODY MASS INDEX: 31.21 KG/M2 | OXYGEN SATURATION: 98 %

## 2022-11-10 DIAGNOSIS — E78.2 MIXED HYPERLIPIDEMIA: ICD-10-CM

## 2022-11-10 DIAGNOSIS — Z23 NEEDS FLU SHOT: ICD-10-CM

## 2022-11-10 DIAGNOSIS — I10 ESSENTIAL HYPERTENSION: ICD-10-CM

## 2022-11-10 DIAGNOSIS — Z23 ENCOUNTER FOR IMMUNIZATION: ICD-10-CM

## 2022-11-10 DIAGNOSIS — Z00.00 WELL ADULT HEALTH CHECK: Primary | ICD-10-CM

## 2022-11-10 DIAGNOSIS — Z11.59 ENCOUNTER FOR HEPATITIS C SCREENING TEST FOR LOW RISK PATIENT: ICD-10-CM

## 2022-11-10 PROCEDURE — 90686 IIV4 VACC NO PRSV 0.5 ML IM: CPT | Performed by: NURSE PRACTITIONER

## 2022-11-10 PROCEDURE — 99396 PREV VISIT EST AGE 40-64: CPT | Performed by: NURSE PRACTITIONER

## 2022-11-10 PROCEDURE — 90471 IMMUNIZATION ADMIN: CPT | Performed by: NURSE PRACTITIONER

## 2022-11-10 PROCEDURE — 99213 OFFICE O/P EST LOW 20 MIN: CPT | Performed by: NURSE PRACTITIONER

## 2022-11-10 RX ORDER — ZOSTER VACCINE RECOMBINANT, ADJUVANTED 50 MCG/0.5
0.5 KIT INTRAMUSCULAR ONCE
Qty: 0.5 ML | Refills: 0 | Status: SHIPPED | OUTPATIENT
Start: 2022-11-10 | End: 2022-11-10

## 2022-11-10 NOTE — PROGRESS NOTES
HISTORY OF PRESENT ILLNESS  Samantha Subramanian is a 47 y.o. male. HPI  Samantha Subramanian is here for complete health maintenance physical exam and screening. he does have other concerns. Health maintenance hx includes:  Exercise: moderately active. Form of exercise: Walking  Diet: generally follows a low fat low cholesterol diet, trying to cut carbs in his diet    Cancer screening:    Colon cancer screening:  Last Colonoscopy: 2020 and was normal --repeat in 10 years   Prostate cancer screening: PSA and/or GABRIEL:   Lab Results   Component Value Date/Time    Prostate Specific Ag 0.6 10/01/2021 03:11 PM    Prostate Specific Ag 0.5 01/13/2020 02:58 PM     Subjective:   Samantha Subramanian is a 47 y.o. male with hypertension. Hypertension ROS: taking medications as instructed, no medication side effects noted, no TIA's, no chest pain on exertion, no dyspnea on exertion, no swelling of ankles. New concerns: Has been well controlled with lisinopril. His cholesterol has been elevated in the past; he is not fasting today but will return to have his labs drawn. Has been trying to cut down on his intake of carbohydrates and does not eat much bread but admits that he did not adhere to a very healthy diet during the month of October. He is trying to get back on track.         Lab Results   Component Value Date/Time    Cholesterol, total 221 (H) 10/01/2021 03:11 PM    HDL Cholesterol 51 10/01/2021 03:11 PM    LDL, calculated 144 (H) 10/01/2021 03:11 PM    LDL, calculated 141 (H) 01/13/2020 02:58 PM    VLDL, calculated 26 10/01/2021 03:11 PM    VLDL, calculated 29 01/13/2020 02:58 PM    Triglyceride 147 10/01/2021 03:11 PM       Lab Results   Component Value Date/Time    Glucose 81 10/01/2021 03:11 PM         Immunizations:     Immunization History   Administered Date(s) Administered    COVID-19, PFIZER PURPLE top, DILUTE for use, (age 15 y+), IM, 30mcg/0.3mL 03/24/2021, 04/16/2021, 12/07/2021    Influenza, FLUARIX, FLULAVAL, FLUZONE (age 10 mo+) AND AFLURIA, (age 1 y+), PF, 0.5mL 10/29/2019, 09/08/2021, 11/10/2022    Td 07/10/2014      Immunization status: Influenza vaccine given in office. Social History     Socioeconomic History    Marital status:      Spouse name: Not on file    Number of children: Not on file    Years of education: Not on file    Highest education level: Not on file   Occupational History    Not on file   Tobacco Use    Smoking status: Never    Smokeless tobacco: Never   Vaping Use    Vaping Use: Never used   Substance and Sexual Activity    Alcohol use: Yes     Comment: rare    Drug use: No    Sexual activity: Yes   Other Topics Concern    Not on file   Social History Narrative    Not on file     Social Determinants of Health     Financial Resource Strain: Not on file   Food Insecurity: Not on file   Transportation Needs: Not on file   Physical Activity: Not on file   Stress: Not on file   Social Connections: Not on file   Intimate Partner Violence: Not on file   Housing Stability: Not on file     Past Surgical History:   Procedure Laterality Date    HX COLONOSCOPY  03/2020    normal -- repeat in 10 years    HX MENISCUS REPAIR Left 2014    HX OTHER SURGICAL  2011    Rectal becker    HX OTHER SURGICAL  2019    Basal cell removal on face    HX TONSILLECTOMY  1975    HX WISDOM TEETH EXTRACTION  1988     Family History   Problem Relation Age of Onset    Heart Disease Mother     Cancer Mother         pancreatic cancer    Heart Disease Father     Hypertension Father     Dementia Father     No Known Problems Brother     Cancer Maternal Grandfather     No Known Problems Son     No Known Problems Daughter      Current Outpatient Medications on File Prior to Visit   Medication Sig Dispense Refill    meloxicam (MOBIC) 15 mg tablet TAKE 1 TABLET BY MOUTH EVERY DAY 90 Tablet 1    escitalopram oxalate (LEXAPRO) 5 mg tablet Take 1 Tablet by mouth daily.  90 Tablet 1    lisinopriL (PRINIVIL, ZESTRIL) 5 mg tablet TAKE 1 TABLET BY MOUTH EVERY DAY 90 Tablet 2    psyllium (METAMUCIL) packet Take 1 Packet by mouth nightly. clonazePAM (KlonoPIN) 0.5 mg tablet Take 1 Tablet by mouth two (2) times daily as needed for Anxiety. Max Daily Amount: 1 mg. (Patient not taking: Reported on 11/10/2022) 10 Tablet 0    [DISCONTINUED] diclofenac EC (VOLTAREN) 75 mg EC tablet Take 75 mg by mouth two (2) times a day. (Patient not taking: Reported on 11/10/2022)       No current facility-administered medications on file prior to visit. .   Review of Systems   Constitutional:  Negative for chills, fever and malaise/fatigue. HENT:  Negative for congestion and sore throat. Eyes:  Negative for blurred vision. Respiratory:  Negative for cough and shortness of breath. Cardiovascular:  Negative for chest pain, palpitations and leg swelling. Gastrointestinal:  Negative for abdominal pain, blood in stool, constipation, diarrhea, heartburn, nausea and vomiting. Genitourinary:  Negative for dysuria, frequency, hematuria and urgency. Musculoskeletal:  Negative for joint pain and myalgias. Skin:  Negative for rash. Neurological:  Negative for headaches. Psychiatric/Behavioral:  The patient is not nervous/anxious. /81 (BP 1 Location: Left upper arm, BP Patient Position: Sitting, BP Cuff Size: Adult)   Pulse 64   Temp 97.9 °F (36.6 °C) (Oral)   Resp 16   Ht 5' 10\" (1.778 m)   Wt 218 lb (98.9 kg)   SpO2 98%   BMI 31.28 kg/m²    Physical Exam  Vitals and nursing note reviewed. Constitutional:       General: He is not in acute distress. Appearance: Normal appearance. HENT:      Head: Normocephalic and atraumatic. Right Ear: Tympanic membrane, ear canal and external ear normal.      Left Ear: Tympanic membrane, ear canal and external ear normal.      Nose: Nose normal.      Mouth/Throat:      Mouth: Mucous membranes are moist.      Pharynx: Oropharynx is clear.    Cardiovascular:      Rate and Rhythm: Normal rate and regular rhythm. Pulmonary:      Effort: Pulmonary effort is normal.      Breath sounds: Normal breath sounds. No wheezing or rhonchi. Abdominal:      General: Bowel sounds are normal.      Palpations: Abdomen is soft. Tenderness: There is no abdominal tenderness. Musculoskeletal:         General: No swelling. Normal range of motion. Cervical back: Normal range of motion and neck supple. Skin:     General: Skin is warm and dry. Neurological:      General: No focal deficit present. Mental Status: He is alert and oriented to person, place, and time. Psychiatric:         Mood and Affect: Mood normal.         Behavior: Behavior normal.       ASSESSMENT and PLAN  Diagnoses and all orders for this visit:    1. Well adult health check  -     CBC WITH AUTOMATED DIFF; Future  -     METABOLIC PANEL, COMPREHENSIVE; Future  -     LIPID PANEL; Future  -     TSH 3RD GENERATION; Future  -     PSA W/ REFLX FREE PSA; Future    2. Essential hypertension --stable on lisinopril; no need for medication changes at this time. 3. Mixed hyperlipidemia --has been elevated in the past; he has been trying to watch his intake of fats and carbs. He will return for fasting labs  -     LIPID PANEL; Future    4. Encounter for hepatitis C screening test for low risk patient  -     HEPATITIS C AB; Future    5. Encounter for immunization  -     varicella-zoster recombinant, PF, (Shingrix, PF,) 50 mcg/0.5 mL susr injection; 0.5 mL by IntraMUSCular route once for 1 dose. 6. Needs flu shot  -     INFLUENZA, FLUARIX, FLULAVAL, FLUZONE (AGE 6 MO+), AFLURIA(AGE 3Y+) IM, PF, 0.5 ML    Cassandra Topher was counseled on age-appropriate/ guideline-based risk prevention behaviors and screening for a 47y.o. year old   male . We also discussed adjustments in screening based on family history if necessary.    Printed instructions for preventative screening guidelines and healthy behaviors given to patient with after visit summary.        lab results and schedule of future lab studies reviewed with patient  reviewed diet, exercise and weight control  cardiovascular risk and specific lipid/LDL goals reviewed  reviewed medications and side effects in detail

## 2022-11-10 NOTE — PATIENT INSTRUCTIONS
Vaccine Information Statement    Influenza (Flu) Vaccine (Inactivated or Recombinant): What You Need to Know    Many vaccine information statements are available in Hebrew and other languages. See www.immunize.org/vis. Hojas de información sobre vacunas están disponibles en español y en muchos otros idiomas. Visite www.immunize.org/vis. 1. Why get vaccinated? Influenza vaccine can prevent influenza (flu). Flu is a contagious disease that spreads around the [facility] every year, usually between October and May. Anyone can get the flu, but it is more dangerous for some people. Infants and young children, people 72 years and older, pregnant people, and people with certain health conditions or a weakened immune system are at greatest risk of flu complications. Pneumonia, bronchitis, sinus infections, and ear infections are examples of flu-related complications. If you have a medical condition, such as heart disease, cancer, or diabetes, flu can make it worse. Flu can cause fever and chills, sore throat, muscle aches, fatigue, cough, headache, and runny or stuffy nose. Some people may have vomiting and diarrhea, though this is more common in children than adults. In an average year, thousands of people in the [facility] die from flu, and many more are hospitalized. Flu vaccine prevents millions of illnesses and flu-related visits to the doctor each year. 2. Influenza vaccines     CDC recommends everyone 6 months and older get vaccinated every flu season. Children 6 months through 6years of age may need 2 doses during a single flu season. Everyone else needs only 1 dose each flu season. It takes about 2 weeks for protection to develop after vaccination. There are many flu viruses, and they are always changing. Each year a new flu vaccine is made to protect against the influenza viruses believed to be likely to cause disease in the upcoming flu season.  Even when the vaccine doesnt exactly match these viruses, it may still provide some protection. Influenza vaccine does not cause flu. Influenza vaccine may be given at the same time as other vaccines. 3. Talk with your health care provider    Tell your vaccination provider if the person getting the vaccine:  Has had an allergic reaction after a previous dose of influenza vaccine, or has any severe, life-threatening allergies   Has ever had Guillain-Barré Syndrome (also called GBS)    In some cases, your health care provider may decide to postpone influenza vaccination until a future visit. Influenza vaccine can be administered at any time during pregnancy. People who are or will be pregnant during influenza season should receive inactivated influenza vaccine. People with minor illnesses, such as a cold, may be vaccinated. People who are moderately or severely ill should usually wait until they recover before getting influenza vaccine. Your health care provider can give you more information. 4. Risks of a vaccine reaction    Soreness, redness, and swelling where the shot is given, fever, muscle aches, and headache can happen after influenza vaccination. There may be a very small increased risk of Guillain-Barré Syndrome (GBS) after inactivated influenza vaccine (the flu shot). Abdullahi Ashing children who get the flu shot along with pneumococcal vaccine (PCV13) and/or DTaP vaccine at the same time might be slightly more likely to have a seizure caused by fever. Tell your health care provider if a child who is getting flu vaccine has ever had a seizure. People sometimes faint after medical procedures, including vaccination. Tell your provider if you feel dizzy or have vision changes or ringing in the ears. As with any medicine, there is a very remote chance of a vaccine causing a severe allergic reaction, other serious injury, or death. 5. What if there is a serious problem?     An allergic reaction could occur after the vaccinated person leaves the clinic. If you see signs of a severe allergic reaction (hives, swelling of the face and throat, difficulty breathing, a fast heartbeat, dizziness, or weakness), call 9-1-1 and get the person to the nearest hospital.    For other signs that concern you, call your health care provider. Adverse reactions should be reported to the Vaccine Adverse Event Reporting System (VAERS). Your health care provider will usually file this report, or you can do it yourself. Visit the VAERS website at www.vaers. [facility].gov or call 8-550.768.1593. VAERS is only for reporting reactions, and VAERS staff members do not give medical advice. 6. The National Vaccine Injury Compensation Program    The [facility] Vaccine Injury Compensation Program (VICP) is a federal program that was created to compensate people who may have been injured by certain vaccines. Claims regarding alleged injury or death due to vaccination have a time limit for filing, which may be as short as two years. Visit the VICP website at www.[facility].gov/vaccinecompensation or call 2-493.154.7974 to learn about the program and about filing a claim. 7. How can I learn more? Ask your health care provider. Call your local or state health department. Visit the website of the Food and Drug Administration (FDA) for vaccine package inserts and additional information at www.fda.gov/vaccines-blood-biologics/vaccines. Contact the Centers for Disease Control and Prevention (CDC): Call 7-908.439.1488 (1-800-CDC-INFO) or  Visit CDCs influenza website at www.cdc.gov/flu. Vaccine Information Statement   Inactivated Influenza Vaccine   8/6/2021  42 ATA Amador [id number]   Department of Health and Human Services  Centers for Disease Control and Prevention    Office Use Only

## 2022-11-10 NOTE — PROGRESS NOTES
Patient present for routine immunizations. Pt denies any symptoms , reactions or allergies that would exclude them from being immunized today. Risks and adverse reactions were discussed and the VIS was given to them. All questions were addressed. Pt was observed for 10 min post injection. There were no reactions observed.     Laura Alvarez LPN

## 2022-12-05 ENCOUNTER — TELEPHONE (OUTPATIENT)
Dept: INTERNAL MEDICINE CLINIC | Age: 52
End: 2022-12-05

## 2022-12-05 NOTE — TELEPHONE ENCOUNTER
Incoming call on the NT line for patient c/o cough congestion & sore throat x 3 days. Denies fevers & sob. Discussed with the [facility] rep named Cordell Jose this is the NT line & not the line for appts. She stated she could not get thru onto the office appt line. Pt was marked as needing an urgent appt. Discussed with patient would have 's nurse to decide on appt scheduling. Pt stated he was fine to be seen virtually or in-person. Discussed given his cough may need an in-person appt to have his lungs listened to. He stated this was fine & has open availability. Please reach out to provide that fits PCP schedule. NP is booked for the week.

## 2022-12-06 ENCOUNTER — OFFICE VISIT (OUTPATIENT)
Dept: INTERNAL MEDICINE CLINIC | Age: 52
End: 2022-12-06
Payer: COMMERCIAL

## 2022-12-06 VITALS
TEMPERATURE: 98.4 F | RESPIRATION RATE: 16 BRPM | OXYGEN SATURATION: 95 % | SYSTOLIC BLOOD PRESSURE: 128 MMHG | WEIGHT: 227.8 LBS | HEIGHT: 70 IN | BODY MASS INDEX: 32.61 KG/M2 | DIASTOLIC BLOOD PRESSURE: 88 MMHG | HEART RATE: 75 BPM

## 2022-12-06 DIAGNOSIS — J06.9 UPPER RESPIRATORY TRACT INFECTION, UNSPECIFIED TYPE: Primary | ICD-10-CM

## 2022-12-06 DIAGNOSIS — I10 ESSENTIAL HYPERTENSION: ICD-10-CM

## 2022-12-06 PROCEDURE — 99213 OFFICE O/P EST LOW 20 MIN: CPT | Performed by: INTERNAL MEDICINE

## 2022-12-06 RX ORDER — AZITHROMYCIN 250 MG/1
250 TABLET, FILM COATED ORAL SEE ADMIN INSTRUCTIONS
Qty: 6 TABLET | Refills: 0 | Status: SHIPPED | OUTPATIENT
Start: 2022-12-06 | End: 2022-12-11

## 2022-12-06 NOTE — PROGRESS NOTES
Sung Stern  Identified pt with two pt identifiers(name and ). Chief Complaint   Patient presents with    Sore Throat     RM18// pt presenting today with cough and sore throat that started on Friday that has progressed over the last few days       1. Have you been to the ER, urgent care clinic since your last visit? Hospitalized since your last visit? NO    2. Have you seen or consulted any other health care providers outside of the [address] [place] [address] since your last visit? Include any pap smears or colon screening. NO      Provider notified of reason for visit, vitals and flowsheets obtained on patients.      Patient received paperwork for advance directive during previous visit but has not completed at this time     Reviewed record In preparation for visit, huddled with provider and have obtained necessary documentation      Health Maintenance Due   Topic    Hepatitis C Screening     Shingrix Vaccine Age 50> (1 of 2)    COVID-19 Vaccine (4 - Booster for Pfizer series)       Wt Readings from Last 3 Encounters:   22 227 lb 12.8 oz (103.3 kg)   11/10/22 218 lb (98.9 kg)   22 215 lb (97.5 kg)     Temp Readings from Last 3 Encounters:   22 98.4 °F (36.9 °C) (Oral)   11/10/22 97.9 °F (36.6 °C) (Oral)   22 97.9 °F (36.6 °C) (Oral)     BP Readings from Last 3 Encounters:   22 128/88   11/10/22 124/81   22 133/82     Pulse Readings from Last 3 Encounters:   22 75   11/10/22 64   22 73     Vitals:    22 1506   BP: 128/88   Pulse: 75   Resp: 16   Temp: 98.4 °F (36.9 °C)   TempSrc: Oral   SpO2: 95%   Weight: 227 lb 12.8 oz (103.3 kg)   Height: 5' 10\" (1.778 m)   PainSc:   4   PainLoc: Throat         Learning Assessment:  :     Learning Assessment 2017   PRIMARY LEARNER Patient   HIGHEST LEVEL OF EDUCATION - PRIMARY LEARNER  > 4 YEARS OF COLLEGE   BARRIERS PRIMARY LEARNER NONE   CO-LEARNER CAREGIVER No   PRIMARY LANGUAGE ENGLISH   LEARNER PREFERENCE PRIMARY LISTENING   ANSWERED BY patient   RELATIONSHIP SELF       Depression Screening:  :     3 most recent PHQ Screens 11/10/2022   Little interest or pleasure in doing things Not at all   Feeling down, depressed, irritable, or hopeless Not at all   Total Score PHQ 2 0       Fall Risk Assessment:  :     Fall Risk Assessment, last 12 mths 11/10/2022   Able to walk? Yes   Fall in past 12 months? 0   Do you feel unsteady? 0   Are you worried about falling 0       Abuse Screening:  :     Abuse Screening Questionnaire 1/13/2020 1/9/2020 2/6/2019 8/14/2018 7/31/2017   Do you ever feel afraid of your partner? N N N N N   Are you in a relationship with someone who physically or mentally threatens you? N N N N N   Is it safe for you to go home? Y Y Y Y Y       ADL Screening:  :     ADL Assessment 11/10/2022   Feeding yourself No Help Needed   Getting from bed to chair No Help Needed   Getting dressed No Help Needed   Bathing or showering No Help Needed   Walk across the room (includes cane/walker) No Help Needed   Using the telphone No Help Needed   Taking your medications No Help Needed   Preparing meals No Help Needed   Managing money (expenses/bills) No Help Needed   Moderately strenuous housework (laundry) No Help Needed   Shopping for personal items (toiletries/medicines) No Help Needed   Shopping for groceries No Help Needed   Driving No Help Needed   Climbing a flight of stairs No Help Needed   Getting to places beyond walking distances No Help Needed         Medication reconciliation up to date and corrected with patient at this time.

## 2022-12-06 NOTE — PROGRESS NOTES
Jason Jackson is a 47 y.o. male who presents today for Sore Throat (RM18// pt presenting today with cough and sore throat that started on Friday that has progressed over the last few days)  . 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 Today patient is here for follow up. Anxiety levels have been a bit high as he has unexpectedly lost a coworker recently. Otherwise, his mood has been slowly improving. Upper respiratory illness:  Jason Jackson presents with complaints of sore throat, dry cough, productive cough, and hoarseness for 4 days. no nausea and no vomiting . he has not had  fever and chills. Symptoms are moderate. Over-the-counter remedies including OTC cold medication   Drinking plenty of fluids: yes  Asthma?:  no  non-smoker  Contacts with similar infections: yes, daughter. Hypertension - stable. Hypertension ROS: taking medications as instructed, no medication side effects noted, no TIA's, no chest pain on exertion, no dyspnea on exertion, no swelling of ankles     reports that he has never smoked. He has never used smokeless tobacco.    reports current alcohol use. BP Readings from Last 2 Encounters:   12/06/22 128/88   11/10/22 124/81       ROS  Review of Systems   Constitutional:  Negative for chills, fever and weight loss. HENT:  Positive for congestion. Negative for sore throat. Eyes:  Negative for blurred vision, double vision, photophobia, pain and discharge. Respiratory:  Positive for cough. Negative for hemoptysis, sputum production and shortness of breath. Cardiovascular:  Negative for chest pain, palpitations, orthopnea, claudication and leg swelling. Gastrointestinal:  Negative for abdominal pain, constipation, diarrhea, heartburn, nausea and vomiting. Genitourinary:  Negative for dysuria, frequency and urgency.    Musculoskeletal:  Negative for back pain, joint pain, myalgias and neck pain. Skin:  Negative for rash. Neurological: Negative. Negative for headaches. Endo/Heme/Allergies:  Does not bruise/bleed easily. Psychiatric/Behavioral:  Negative for memory loss and suicidal ideas. The patient is nervous/anxious. Visit Vitals  /88 (BP 1 Location: Left upper arm, BP Patient Position: Sitting, BP Cuff Size: Large adult)   Pulse 75   Temp 98.4 °F (36.9 °C) (Oral)   Resp 16   Ht 5' 10\" (1.778 m)   Wt 227 lb 12.8 oz (103.3 kg)   SpO2 95%   BMI 32.69 kg/m²       Physical Exam  Constitutional:       Appearance: He is well-developed. He is not diaphoretic. HENT:      Head: Normocephalic and atraumatic. Right Ear: Tympanic membrane normal.      Left Ear: Tympanic membrane normal.      Nose: Nose normal.   Eyes:      Pupils: Pupils are equal, round, and reactive to light. Cardiovascular:      Rate and Rhythm: Normal rate and regular rhythm. Heart sounds: No murmur heard. Pulmonary:      Effort: Pulmonary effort is normal. No respiratory distress. Breath sounds: Normal breath sounds. Comments: Coarse breath sound to right lower lobe. Otherwise no wheezing. Good air movement  Musculoskeletal:         General: Normal range of motion. Skin:     General: Skin is warm and dry. Neurological:      Mental Status: He is alert and oriented to person, place, and time. Psychiatric:         Behavior: Behavior normal.         Current Outpatient Medications   Medication Sig    meloxicam (MOBIC) 15 mg tablet TAKE 1 TABLET BY MOUTH EVERY DAY    escitalopram oxalate (LEXAPRO) 5 mg tablet Take 1 Tablet by mouth daily. lisinopriL (PRINIVIL, ZESTRIL) 5 mg tablet TAKE 1 TABLET BY MOUTH EVERY DAY    psyllium (METAMUCIL) packet Take 1 Packet by mouth nightly. clonazePAM (KlonoPIN) 0.5 mg tablet Take 1 Tablet by mouth two (2) times daily as needed for Anxiety. Max Daily Amount: 1 mg.  (Patient not taking: Reported on 12/6/2022) No current facility-administered medications for this visit. Past Medical History:   Diagnosis Date    Hypertension     Tennis elbow       Past Surgical History:   Procedure Laterality Date    HX COLONOSCOPY  03/2020    normal -- repeat in 10 years    HX MENISCUS REPAIR Left 2014    HX OTHER SURGICAL  2011    Rectal becker    HX OTHER SURGICAL  2019    Basal cell removal on face    HX TONSILLECTOMY  1975    HX WISDOM TEETH EXTRACTION  1988      Social History     Tobacco Use    Smoking status: Never    Smokeless tobacco: Never   Substance Use Topics    Alcohol use: Yes     Comment: rare      Family History   Problem Relation Age of Onset    Heart Disease Mother     Cancer Mother         pancreatic cancer    Heart Disease Father     Hypertension Father     Dementia Father     No Known Problems Brother     Cancer Maternal Grandfather     No Known Problems Son     No Known Problems Daughter         Allergies   Allergen Reactions    Omnicef [Cefdinir] Hives        Assessment/Plan  Diagnoses and all orders for this visit:    1. Upper respiratory tract infection, unspecified type-low threshold for cough suppressant if cough worsens. -     azithromycin (ZITHROMAX) 250 mg tablet; Take 1 Tablet by mouth See Admin Instructions for 5 days. 2. Essential hypertension-stable          Clive Cintron MD  12/6/2022    This note was created with the help of speech recognition software Gilberto Lozano) and may contain some 'sound alike' errors.

## 2022-12-07 ENCOUNTER — TELEPHONE (OUTPATIENT)
Dept: INTERNAL MEDICINE CLINIC | Age: 52
End: 2022-12-07

## 2022-12-07 RX ORDER — BENZONATATE 200 MG/1
200 CAPSULE ORAL
Qty: 21 CAPSULE | Refills: 0 | Status: SHIPPED | OUTPATIENT
Start: 2022-12-07 | End: 2022-12-14

## 2022-12-07 NOTE — TELEPHONE ENCOUNTER
Patient called to state he was his PCP yesterday and accept the z-ally but declined the cough medicine. Patient states he has been coughing a lot since last night and would like to know if the doctor would fill the medication he had previously offered. Please call and advise.

## 2023-03-21 ENCOUNTER — OFFICE VISIT (OUTPATIENT)
Dept: INTERNAL MEDICINE CLINIC | Age: 53
End: 2023-03-21
Payer: COMMERCIAL

## 2023-03-21 VITALS
WEIGHT: 231.4 LBS | BODY MASS INDEX: 33.13 KG/M2 | DIASTOLIC BLOOD PRESSURE: 84 MMHG | HEART RATE: 63 BPM | OXYGEN SATURATION: 95 % | SYSTOLIC BLOOD PRESSURE: 128 MMHG | HEIGHT: 70 IN | RESPIRATION RATE: 16 BRPM | TEMPERATURE: 98 F

## 2023-03-21 DIAGNOSIS — I10 ESSENTIAL HYPERTENSION: ICD-10-CM

## 2023-03-21 DIAGNOSIS — J32.9 SINUSITIS, UNSPECIFIED CHRONICITY, UNSPECIFIED LOCATION: Primary | ICD-10-CM

## 2023-03-21 DIAGNOSIS — F43.29 ADJUSTMENT DISORDER WITH OTHER SYMPTOM: ICD-10-CM

## 2023-03-21 PROCEDURE — 99214 OFFICE O/P EST MOD 30 MIN: CPT | Performed by: INTERNAL MEDICINE

## 2023-03-21 RX ORDER — AZITHROMYCIN 250 MG/1
250 TABLET, FILM COATED ORAL SEE ADMIN INSTRUCTIONS
Qty: 6 TABLET | Refills: 0 | Status: SHIPPED | OUTPATIENT
Start: 2023-03-21 | End: 2023-03-26

## 2023-03-21 RX ORDER — GUAIFENESIN 600 MG/1
600 TABLET, EXTENDED RELEASE ORAL 2 TIMES DAILY
COMMUNITY

## 2023-03-21 NOTE — PROGRESS NOTES
Pau Bueno is a 47 y.o. male who presents today for Sinus Infection (RM18// pt presenting today with sinus congestion x 2 weeks has used OTC medications, woke this morning with a sore throat.)  . 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 Today patient is here for an acute visit. Claudy Rodriguez Upper respiratory illness:  Pau Bueno presents with complaints of congestion, post nasal drip, and bilateral sinus pain for 2 weeks. Chills, but no fevers. No nausea and no vomiting . he has not had  dry cough and productive cough. Symptoms are moderate. Over-the-counter remedies including OTC sinus medication. Drinking plenty of fluids: yes  Asthma?:  no  non-smoker  Contacts with similar infections: yes     Blood pressure a bit better on repeat. Remains on an SSRI. Acute stressors with father have decreased recently conflict with his brother and finances have increased. We will continue SSRI for time being. ROS  Review of Systems   Constitutional:  Negative for chills, fever and weight loss. HENT:  Positive for congestion, sinus pain and sore throat. Negative for ear discharge, ear pain, nosebleeds and tinnitus. Eyes:  Negative for blurred vision, double vision and photophobia. Respiratory:  Negative for cough, hemoptysis, sputum production and shortness of breath. Cardiovascular:  Negative for chest pain, palpitations and leg swelling. Gastrointestinal:  Negative for abdominal pain, constipation, diarrhea, heartburn, nausea and vomiting. Genitourinary:  Negative for dysuria, frequency and urgency. Musculoskeletal:  Negative for joint pain and myalgias. Skin:  Negative for rash. Neurological: Negative. Negative for headaches. Endo/Heme/Allergies:  Does not bruise/bleed easily. Psychiatric/Behavioral:  Negative for memory loss and suicidal ideas.       Visit Vitals  BP 128/84   Pulse 63   Temp 98 °F (36.7 °C) (Oral)   Resp 16   Ht 5' 10\" (1.778 m)   Wt 231 lb 6.4 oz (105 kg)   SpO2 95%   BMI 33.20 kg/m²       Physical Exam  Constitutional:       Appearance: He is well-developed. He is not diaphoretic. HENT:      Head: Normocephalic and atraumatic. Ears:      Comments: Fluid behind TM     Nose: Nose normal.   Eyes:      Pupils: Pupils are equal, round, and reactive to light. Cardiovascular:      Rate and Rhythm: Normal rate and regular rhythm. Heart sounds: No murmur heard. Pulmonary:      Effort: Pulmonary effort is normal. No respiratory distress. Breath sounds: Normal breath sounds. Abdominal:      General: Abdomen is flat. Palpations: Abdomen is soft. Musculoskeletal:         General: Normal range of motion. Skin:     General: Skin is warm and dry. Neurological:      Mental Status: He is alert and oriented to person, place, and time. Psychiatric:         Behavior: Behavior normal.         Current Outpatient Medications   Medication Sig    guaiFENesin ER (Mucinex) 600 mg ER tablet Take 600 mg by mouth two (2) times a day. azithromycin (ZITHROMAX) 250 mg tablet Take 1 Tablet by mouth See Admin Instructions for 5 days. meloxicam (MOBIC) 15 mg tablet TAKE 1 TABLET BY MOUTH EVERY DAY    escitalopram oxalate (LEXAPRO) 5 mg tablet Take 1 Tablet by mouth daily. lisinopriL (PRINIVIL, ZESTRIL) 5 mg tablet TAKE 1 TABLET BY MOUTH EVERY DAY    clonazePAM (KlonoPIN) 0.5 mg tablet Take 1 Tablet by mouth two (2) times daily as needed for Anxiety. Max Daily Amount: 1 mg.    psyllium (METAMUCIL) packet Take 1 Packet by mouth nightly. No current facility-administered medications for this visit.         Past Medical History:   Diagnosis Date    Hypertension     Tennis elbow       Past Surgical History:   Procedure Laterality Date    HX COLONOSCOPY  03/2020    normal -- repeat in 10 years    HX MENISCUS REPAIR Left 2014    HX OTHER SURGICAL  2011 Rectal becker    HX OTHER SURGICAL  2019    Basal cell removal on face    HX TONSILLECTOMY  1975    HX WISDOM TEETH EXTRACTION  1988      Social History     Tobacco Use    Smoking status: Never    Smokeless tobacco: Never   Substance Use Topics    Alcohol use: Yes     Comment: rare      Family History   Problem Relation Age of Onset    Heart Disease Mother     Cancer Mother         pancreatic cancer    Heart Disease Father     Hypertension Father     Dementia Father     No Known Problems Brother     Cancer Maternal Grandfather     No Known Problems Son     No Known Problems Daughter         Allergies   Allergen Reactions    Omnicef [Cefdinir] Hives        Assessment/Plan  Diagnoses and all orders for this visit:    1. Sinusitis, unspecified chronicity, unspecified location-continue Mucinex  -     azithromycin (ZITHROMAX) 250 mg tablet; Take 1 Tablet by mouth See Admin Instructions for 5 days. 2. Essential hypertension-better on repeat    3. Adjustment disorder with other symptom-stressors are up with his brother. Overall he is managing this. Continue SSRI for time being          Carline Balbuena MD  3/21/2023    This note was created with the help of speech recognition software Eitan Mercado) and may contain some 'sound alike' errors.

## 2023-03-21 NOTE — PROGRESS NOTES
Sung Stern  Identified pt with two pt identifiers(name and ). Chief Complaint   Patient presents with    Sinus Infection     RM18// pt presenting today with sinus congestion x 2 weeks has used OTC medications, woke this morning with a sore throat. 1. Have you been to the ER, urgent care clinic since your last visit? Hospitalized since your last visit? NO    2. Have you seen or consulted any other health care providers outside of the [address] [place] [address] since your last visit? Include any pap smears or colon screening. NO      Provider notified of reason for visit, vitals and flowsheets obtained on patients.      Patient received paperwork for advance directive during previous visit but has not completed at this time     Reviewed record In preparation for visit, huddled with provider and have obtained necessary documentation      Health Maintenance Due   Topic    Hepatitis C Screening     Shingles Vaccine (1 of 2)    COVID-19 Vaccine (4 - Booster for Pfizer series)       Wt Readings from Last 3 Encounters:   23 231 lb 6.4 oz (105 kg)   22 227 lb 12.8 oz (103.3 kg)   11/10/22 218 lb (98.9 kg)     Temp Readings from Last 3 Encounters:   23 98 °F (36.7 °C) (Oral)   22 98.4 °F (36.9 °C) (Oral)   11/10/22 97.9 °F (36.6 °C) (Oral)     BP Readings from Last 3 Encounters:   23 (!) 133/90   22 128/88   11/10/22 124/81     Pulse Readings from Last 3 Encounters:   23 63   22 75   11/10/22 64     Vitals:    23 1403   BP: (!) 133/90   Pulse: 63   Resp: 16   Temp: 98 °F (36.7 °C)   TempSrc: Oral   SpO2: 95%   Weight: 231 lb 6.4 oz (105 kg)   Height: 5' 10\" (1.778 m)   PainSc:   2   PainLoc: Throat         Learning Assessment:  :     Learning Assessment 2017   PRIMARY LEARNER Patient   HIGHEST LEVEL OF EDUCATION - PRIMARY LEARNER  > 4 YEARS OF COLLEGE   BARRIERS PRIMARY LEARNER NONE   CO-LEARNER CAREGIVER No   PRIMARY LANGUAGE ENGLISH   LEARNER PREFERENCE PRIMARY LISTENING   ANSWERED BY patient   RELATIONSHIP SELF       Depression Screening:  :     3 most recent PHQ Screens 11/10/2022   Little interest or pleasure in doing things Not at all   Feeling down, depressed, irritable, or hopeless Not at all   Total Score PHQ 2 0       Fall Risk Assessment:  :     Fall Risk Assessment, last 12 mths 11/10/2022   Able to walk? Yes   Fall in past 12 months? 0   Do you feel unsteady? 0   Are you worried about falling 0       Abuse Screening:  :     Abuse Screening Questionnaire 1/13/2020 1/9/2020 2/6/2019 8/14/2018 7/31/2017   Do you ever feel afraid of your partner? N N N N N   Are you in a relationship with someone who physically or mentally threatens you? N N N N N   Is it safe for you to go home? Y Y Y Y Y       ADL Screening:  :     ADL Assessment 11/10/2022   Feeding yourself No Help Needed   Getting from bed to chair No Help Needed   Getting dressed No Help Needed   Bathing or showering No Help Needed   Walk across the room (includes cane/walker) No Help Needed   Using the telphone No Help Needed   Taking your medications No Help Needed   Preparing meals No Help Needed   Managing money (expenses/bills) No Help Needed   Moderately strenuous housework (laundry) No Help Needed   Shopping for personal items (toiletries/medicines) No Help Needed   Shopping for groceries No Help Needed   Driving No Help Needed   Climbing a flight of stairs No Help Needed   Getting to places beyond walking distances No Help Needed         Medication reconciliation up to date and corrected with patient at this time.

## 2023-05-04 DIAGNOSIS — F43.29 ADJUSTMENT DISORDER WITH OTHER SYMPTOM: ICD-10-CM

## 2023-05-04 RX ORDER — ESCITALOPRAM OXALATE 5 MG/1
TABLET ORAL
Qty: 90 TABLET | Refills: 1 | Status: SHIPPED | OUTPATIENT
Start: 2023-05-04

## 2023-07-16 DIAGNOSIS — I10 ESSENTIAL (PRIMARY) HYPERTENSION: ICD-10-CM

## 2023-07-17 RX ORDER — LISINOPRIL 5 MG/1
TABLET ORAL
Qty: 90 TABLET | Refills: 2 | Status: SHIPPED | OUTPATIENT
Start: 2023-07-17

## 2023-10-31 DIAGNOSIS — F43.29 ADJUSTMENT DISORDER WITH OTHER SYMPTOMS: ICD-10-CM

## 2023-10-31 RX ORDER — ESCITALOPRAM OXALATE 5 MG/1
5 TABLET ORAL DAILY
Qty: 90 TABLET | Refills: 1 | Status: SHIPPED | OUTPATIENT
Start: 2023-10-31

## 2023-12-17 ENCOUNTER — OFFICE VISIT (OUTPATIENT)
Age: 53
End: 2023-12-17

## 2023-12-17 VITALS
WEIGHT: 239.8 LBS | DIASTOLIC BLOOD PRESSURE: 83 MMHG | BODY MASS INDEX: 34.41 KG/M2 | HEART RATE: 106 BPM | SYSTOLIC BLOOD PRESSURE: 133 MMHG | OXYGEN SATURATION: 95 % | TEMPERATURE: 99.5 F

## 2023-12-17 DIAGNOSIS — R06.2 WHEEZING: ICD-10-CM

## 2023-12-17 DIAGNOSIS — R05.1 ACUTE COUGH: ICD-10-CM

## 2023-12-17 DIAGNOSIS — J10.1 INFLUENZA A: Primary | ICD-10-CM

## 2023-12-17 LAB
INFLUENZA A ANTIGEN, POC: POSITIVE
INFLUENZA B ANTIGEN, POC: NEGATIVE
Lab: NORMAL
PERFORMING INSTRUMENT: NORMAL
QC PASS/FAIL: NORMAL
SARS-COV-2, POC: NORMAL
VALID INTERNAL CONTROL, POC: YES

## 2023-12-17 RX ORDER — METHYLPREDNISOLONE 4 MG/1
4 TABLET ORAL SEE ADMIN INSTRUCTIONS
Qty: 1 KIT | Refills: 0 | Status: CANCELLED | OUTPATIENT
Start: 2023-12-17

## 2023-12-17 RX ORDER — BENZONATATE 100 MG/1
100 CAPSULE ORAL 3 TIMES DAILY PRN
Qty: 30 CAPSULE | Refills: 0 | Status: SHIPPED | OUTPATIENT
Start: 2023-12-17 | End: 2023-12-27

## 2023-12-17 RX ORDER — DEXTROMETHORPHAN HYDROBROMIDE AND PROMETHAZINE HYDROCHLORIDE 15; 6.25 MG/5ML; MG/5ML
5 SYRUP ORAL 4 TIMES DAILY PRN
Qty: 118 ML | Refills: 0 | Status: SHIPPED | OUTPATIENT
Start: 2023-12-17 | End: 2023-12-24

## 2023-12-17 RX ORDER — OSELTAMIVIR PHOSPHATE 75 MG/1
75 CAPSULE ORAL 2 TIMES DAILY
Qty: 10 CAPSULE | Refills: 0 | Status: SHIPPED | OUTPATIENT
Start: 2023-12-17 | End: 2023-12-22

## 2023-12-17 RX ORDER — ALBUTEROL SULFATE 90 UG/1
2 AEROSOL, METERED RESPIRATORY (INHALATION) 4 TIMES DAILY PRN
Qty: 18 G | Refills: 0 | Status: SHIPPED | OUTPATIENT
Start: 2023-12-17

## 2024-01-26 ENCOUNTER — TELEPHONE (OUTPATIENT)
Age: 54
End: 2024-01-26

## 2024-01-26 NOTE — TELEPHONE ENCOUNTER
PSR reached out to patient to reschedule appointment due to provider being out of the office - no answer, left detailed VM with date/time change   If patient returns call, please assist in rescheduling if necessary

## 2024-02-21 ENCOUNTER — OFFICE VISIT (OUTPATIENT)
Age: 54
End: 2024-02-21
Payer: COMMERCIAL

## 2024-02-21 VITALS
HEIGHT: 70 IN | DIASTOLIC BLOOD PRESSURE: 80 MMHG | HEART RATE: 64 BPM | RESPIRATION RATE: 16 BRPM | BODY MASS INDEX: 33.64 KG/M2 | OXYGEN SATURATION: 96 % | SYSTOLIC BLOOD PRESSURE: 130 MMHG | TEMPERATURE: 98.4 F | WEIGHT: 235 LBS

## 2024-02-21 DIAGNOSIS — I10 ESSENTIAL HYPERTENSION: ICD-10-CM

## 2024-02-21 DIAGNOSIS — E66.09 CLASS 1 OBESITY DUE TO EXCESS CALORIES WITH SERIOUS COMORBIDITY AND BODY MASS INDEX (BMI) OF 33.0 TO 33.9 IN ADULT: ICD-10-CM

## 2024-02-21 DIAGNOSIS — Z00.00 ANNUAL PHYSICAL EXAM: Primary | ICD-10-CM

## 2024-02-21 DIAGNOSIS — Z12.5 SCREENING FOR PROSTATE CANCER: ICD-10-CM

## 2024-02-21 DIAGNOSIS — Z23 NEED FOR DIPHTHERIA-TETANUS-PERTUSSIS (TDAP) VACCINE: ICD-10-CM

## 2024-02-21 DIAGNOSIS — Z12.11 SCREENING FOR COLON CANCER: ICD-10-CM

## 2024-02-21 DIAGNOSIS — Z11.59 NEED FOR HEPATITIS C SCREENING TEST: ICD-10-CM

## 2024-02-21 DIAGNOSIS — Z23 NEED FOR SHINGLES VACCINE: ICD-10-CM

## 2024-02-21 DIAGNOSIS — F43.29 ADJUSTMENT DISORDER WITH OTHER SYMPTOMS: ICD-10-CM

## 2024-02-21 PROCEDURE — 90471 IMMUNIZATION ADMIN: CPT | Performed by: NURSE PRACTITIONER

## 2024-02-21 PROCEDURE — 90715 TDAP VACCINE 7 YRS/> IM: CPT | Performed by: NURSE PRACTITIONER

## 2024-02-21 PROCEDURE — 3079F DIAST BP 80-89 MM HG: CPT | Performed by: NURSE PRACTITIONER

## 2024-02-21 PROCEDURE — 99396 PREV VISIT EST AGE 40-64: CPT | Performed by: NURSE PRACTITIONER

## 2024-02-21 PROCEDURE — 3075F SYST BP GE 130 - 139MM HG: CPT | Performed by: NURSE PRACTITIONER

## 2024-02-21 RX ORDER — ZOSTER VACCINE RECOMBINANT, ADJUVANTED 50 MCG/0.5
0.5 KIT INTRAMUSCULAR SEE ADMIN INSTRUCTIONS
Qty: 0.5 ML | Refills: 0 | Status: SHIPPED | OUTPATIENT
Start: 2024-02-21 | End: 2024-08-19

## 2024-02-21 RX ORDER — DICLOFENAC SODIUM 75 MG/1
75 TABLET, DELAYED RELEASE ORAL 2 TIMES DAILY
COMMUNITY

## 2024-02-21 SDOH — ECONOMIC STABILITY: HOUSING INSECURITY
IN THE LAST 12 MONTHS, WAS THERE A TIME WHEN YOU DID NOT HAVE A STEADY PLACE TO SLEEP OR SLEPT IN A SHELTER (INCLUDING NOW)?: PATIENT UNABLE TO ANSWER

## 2024-02-21 SDOH — ECONOMIC STABILITY: INCOME INSECURITY
HOW HARD IS IT FOR YOU TO PAY FOR THE VERY BASICS LIKE FOOD, HOUSING, MEDICAL CARE, AND HEATING?: PATIENT UNABLE TO ANSWER

## 2024-02-21 SDOH — ECONOMIC STABILITY: FOOD INSECURITY
WITHIN THE PAST 12 MONTHS, THE FOOD YOU BOUGHT JUST DIDN'T LAST AND YOU DIDN'T HAVE MONEY TO GET MORE.: PATIENT UNABLE TO ANSWER

## 2024-02-21 SDOH — ECONOMIC STABILITY: FOOD INSECURITY
WITHIN THE PAST 12 MONTHS, YOU WORRIED THAT YOUR FOOD WOULD RUN OUT BEFORE YOU GOT MONEY TO BUY MORE.: PATIENT UNABLE TO ANSWER

## 2024-02-21 ASSESSMENT — PATIENT HEALTH QUESTIONNAIRE - PHQ9
SUM OF ALL RESPONSES TO PHQ QUESTIONS 1-9: 0
SUM OF ALL RESPONSES TO PHQ QUESTIONS 1-9: 0
2. FEELING DOWN, DEPRESSED OR HOPELESS: 0
SUM OF ALL RESPONSES TO PHQ QUESTIONS 1-9: 0
1. LITTLE INTEREST OR PLEASURE IN DOING THINGS: 0
SUM OF ALL RESPONSES TO PHQ9 QUESTIONS 1 & 2: 0
SUM OF ALL RESPONSES TO PHQ QUESTIONS 1-9: 0

## 2024-02-21 NOTE — PROGRESS NOTES
Elder Munoz is a 53 y.o. male  Presenting for his annual checkup and health maintenance review and follow-up. Lives with wife and children. He works in IT. PMH includes HTN, adjustment disorder with depression. Does not check blood pressure at home. Admits he does not feel depressed and he is adjusting to family dynamics. He is currently seeing a therapy monthly. Pt would like to consider discontinuing Lexapro as he admits it negatively impacts his weight. Has good support system. Denies SI/HI.    Lifestyle: exercises sporadically, nonsmoker, caffeine intake 2 cups daily, alcohol intake occasional    Health Maintenance   Topic Date Due    Hepatitis C screen  Never done    Shingles vaccine (1 of 2) Never done    Hepatitis B vaccine (1 of 3 - 3-dose series) 02/21/2025 (Originally 1970)    Flu vaccine (1) 02/21/2025 (Originally 8/1/2023)    COVID-19 Vaccine (4 - 2023-24 season) 02/21/2025 (Originally 9/1/2023)    HIV screen  02/21/2025 (Originally 9/10/1984)    Depression Screen  02/21/2025    Lipids  10/01/2026    Colorectal Cancer Screen  03/13/2030    DTaP/Tdap/Td vaccine (2 - Td or Tdap) 02/21/2034    Hepatitis A vaccine  Aged Out    Hib vaccine  Aged Out    Polio vaccine  Aged Out    Meningococcal (ACWY) vaccine  Aged Out    Pneumococcal 0-64 years Vaccine  Aged Out     Health Maintenance reviewed  Lab Results   Component Value Date/Time    PSA 0.6 10/01/2021 03:11 PM    PSA 0.5 01/13/2020 02:58 PM       Vaccinations reviewed  Immunization History   Administered Date(s) Administered    Influenza, FLUARIX, FLULAVAL, FLUZONE (age 6 mo+) AND AFLURIA, (age 3 y+), PF, 0.5mL 10/29/2019, 11/10/2022    TDaP, ADACEL (age 10y-64y), BOOSTRIX (age 10y+), IM, 0.5mL 02/21/2024       Past Medical History:   Diagnosis Date    Hypertension     Tennis elbow       has a past surgical history that includes knee surgery (Left, 2014); [place] tooth extraction (1988); Tonsillectomy (1975); other surgical history (2019);

## 2024-02-23 LAB
25(OH)D3+25(OH)D2 SERPL-MCNC: 28.3 NG/ML (ref 30–100)
ALBUMIN SERPL-MCNC: 5 G/DL (ref 3.8–4.9)
ALBUMIN/GLOB SERPL: 2.9 {RATIO} (ref 1.2–2.2)
ALP SERPL-CCNC: 46 IU/L (ref 44–121)
ALT SERPL-CCNC: 35 IU/L (ref 0–44)
AST SERPL-CCNC: 21 IU/L (ref 0–40)
BASOPHILS # BLD AUTO: 0 X10E3/UL (ref 0–0.2)
BASOPHILS NFR BLD AUTO: 1 %
BILIRUB SERPL-MCNC: 0.6 MG/DL (ref 0–1.2)
BUN SERPL-MCNC: 16 MG/DL (ref 6–24)
BUN/CREAT SERPL: 13 (ref 9–20)
CALCIUM SERPL-MCNC: 9.7 MG/DL (ref 8.7–10.2)
CHLORIDE SERPL-SCNC: 102 MMOL/L (ref 96–106)
CHOLEST SERPL-MCNC: 238 MG/DL (ref 100–199)
CO2 SERPL-SCNC: 21 MMOL/L (ref 20–29)
CREAT SERPL-MCNC: 1.22 MG/DL (ref 0.76–1.27)
EGFRCR SERPLBLD CKD-EPI 2021: 70 ML/MIN/1.73
EOSINOPHIL # BLD AUTO: 0.1 X10E3/UL (ref 0–0.4)
EOSINOPHIL NFR BLD AUTO: 2 %
ERYTHROCYTE [DISTWIDTH] IN BLOOD BY AUTOMATED COUNT: 13 % (ref 11.6–15.4)
GLOBULIN SER CALC-MCNC: 1.7 G/DL (ref 1.5–4.5)
GLUCOSE SERPL-MCNC: 108 MG/DL (ref 70–99)
HBA1C MFR BLD: 5.9 % (ref 4.8–5.6)
HCT VFR BLD AUTO: 52 % (ref 37.5–51)
HDLC SERPL-MCNC: 43 MG/DL
HGB BLD-MCNC: 17.3 G/DL (ref 13–17.7)
IMM GRANULOCYTES # BLD AUTO: 0 X10E3/UL (ref 0–0.1)
IMM GRANULOCYTES NFR BLD AUTO: 1 %
IMP & REVIEW OF LAB RESULTS: NORMAL
LDLC SERPL CALC-MCNC: 170 MG/DL (ref 0–99)
LYMPHOCYTES # BLD AUTO: 1.3 X10E3/UL (ref 0.7–3.1)
LYMPHOCYTES NFR BLD AUTO: 33 %
MCH RBC QN AUTO: 33.3 PG (ref 26.6–33)
MCHC RBC AUTO-ENTMCNC: 33.3 G/DL (ref 31.5–35.7)
MCV RBC AUTO: 100 FL (ref 79–97)
MONOCYTES # BLD AUTO: 0.3 X10E3/UL (ref 0.1–0.9)
MONOCYTES NFR BLD AUTO: 8 %
NEUTROPHILS # BLD AUTO: 2.1 X10E3/UL (ref 1.4–7)
NEUTROPHILS NFR BLD AUTO: 55 %
PLATELET # BLD AUTO: 219 X10E3/UL (ref 150–450)
POTASSIUM SERPL-SCNC: 4.9 MMOL/L (ref 3.5–5.2)
PROT SERPL-MCNC: 6.7 G/DL (ref 6–8.5)
PSA SERPL-MCNC: 0.5 NG/ML (ref 0–4)
RBC # BLD AUTO: 5.19 X10E6/UL (ref 4.14–5.8)
SODIUM SERPL-SCNC: 138 MMOL/L (ref 134–144)
TRIGL SERPL-MCNC: 135 MG/DL (ref 0–149)
VLDLC SERPL CALC-MCNC: 25 MG/DL (ref 5–40)
WBC # BLD AUTO: 3.8 X10E3/UL (ref 3.4–10.8)

## 2024-03-05 ENCOUNTER — TELEPHONE (OUTPATIENT)
Age: 54
End: 2024-03-05

## 2024-03-05 DIAGNOSIS — E78.5 DYSLIPIDEMIA: Primary | ICD-10-CM

## 2024-03-05 RX ORDER — ATORVASTATIN CALCIUM 20 MG/1
20 TABLET, FILM COATED ORAL DAILY
Qty: 90 TABLET | Refills: 0 | Status: SHIPPED | OUTPATIENT
Start: 2024-03-05

## 2024-03-05 NOTE — TELEPHONE ENCOUNTER
Patient called stating at his last appointment he was told he had high cholesterol and would be starting a new medication, patient said nothing has been called into his pharmacy and he doesn't know the name of the medicine.     Elder [phone]     [facility] [address] [place], [place] - [address] [place] [address] DR Hero [phone] - F [phone]

## 2024-03-22 DIAGNOSIS — I10 ESSENTIAL (PRIMARY) HYPERTENSION: ICD-10-CM

## 2024-03-22 RX ORDER — LISINOPRIL 5 MG/1
5 TABLET ORAL DAILY
Qty: 90 TABLET | Refills: 1 | Status: SHIPPED | OUTPATIENT
Start: 2024-03-22

## 2024-04-06 DIAGNOSIS — E78.5 DYSLIPIDEMIA: ICD-10-CM

## 2024-04-08 RX ORDER — ATORVASTATIN CALCIUM 20 MG/1
20 TABLET, FILM COATED ORAL DAILY
Qty: 90 TABLET | Refills: 0 | Status: SHIPPED | OUTPATIENT
Start: 2024-04-08

## 2024-05-04 DIAGNOSIS — F43.29 ADJUSTMENT DISORDER WITH OTHER SYMPTOMS: ICD-10-CM

## 2024-05-06 RX ORDER — ESCITALOPRAM OXALATE 5 MG/1
5 TABLET ORAL DAILY
Qty: 90 TABLET | Refills: 1 | Status: SHIPPED | OUTPATIENT
Start: 2024-05-06

## 2024-09-04 DIAGNOSIS — E78.5 DYSLIPIDEMIA: ICD-10-CM

## 2024-09-04 RX ORDER — ATORVASTATIN CALCIUM 20 MG/1
20 TABLET, FILM COATED ORAL DAILY
Qty: 90 TABLET | Refills: 0 | Status: SHIPPED | OUTPATIENT
Start: 2024-09-04

## 2024-10-20 DIAGNOSIS — I10 ESSENTIAL (PRIMARY) HYPERTENSION: ICD-10-CM

## 2024-10-21 RX ORDER — LISINOPRIL 5 MG/1
5 TABLET ORAL DAILY
Qty: 90 TABLET | Refills: 1 | Status: SHIPPED | OUTPATIENT
Start: 2024-10-21

## 2024-10-30 ENCOUNTER — OFFICE VISIT (OUTPATIENT)
Age: 54
End: 2024-10-30

## 2024-10-30 VITALS
WEIGHT: 245 LBS | BODY MASS INDEX: 35.07 KG/M2 | DIASTOLIC BLOOD PRESSURE: 78 MMHG | OXYGEN SATURATION: 96 % | HEIGHT: 70 IN | SYSTOLIC BLOOD PRESSURE: 127 MMHG | TEMPERATURE: 97.9 F | RESPIRATION RATE: 16 BRPM | HEART RATE: 70 BPM

## 2024-10-30 DIAGNOSIS — R73.01 IFG (IMPAIRED FASTING GLUCOSE): ICD-10-CM

## 2024-10-30 DIAGNOSIS — M25.562 CHRONIC PAIN OF LEFT KNEE: ICD-10-CM

## 2024-10-30 DIAGNOSIS — E78.5 DYSLIPIDEMIA: ICD-10-CM

## 2024-10-30 DIAGNOSIS — Z23 NEED FOR INFLUENZA VACCINATION: ICD-10-CM

## 2024-10-30 DIAGNOSIS — Z01.818 PRE-OP EVALUATION: Primary | ICD-10-CM

## 2024-10-30 DIAGNOSIS — G89.29 CHRONIC PAIN OF LEFT KNEE: ICD-10-CM

## 2024-10-30 DIAGNOSIS — I10 ESSENTIAL HYPERTENSION: ICD-10-CM

## 2024-10-30 ASSESSMENT — ENCOUNTER SYMPTOMS
BACK PAIN: 0
EYE DISCHARGE: 0
WHEEZING: 0
ABDOMINAL PAIN: 0
SHORTNESS OF BREATH: 0
EYE PAIN: 0
CONSTIPATION: 0
FACIAL SWELLING: 0
DIARRHEA: 0
SORE THROAT: 0
COLOR CHANGE: 0
EYE ITCHING: 0

## 2024-10-30 NOTE — PROGRESS NOTES
Daughter     Heart Disease Father     Hypertension Father         Allergies   Allergen Reactions    Cefdinir Hives        Assessment/Plan  Elder was seen today for pre-op exam.    Diagnoses and all orders for this visit:    Pre-op evaluation-patient stable to proceed with elective procedure.  Continue current medications.  Skip ACE inhibitor morning of surgery.  -     AMB POC EKG ROUTINE  -     Comprehensive Metabolic Panel; Future  -     CBC with Auto Differential; Future  -     Protime-INR; Future    Chronic pain of left knee-scheduled for total knee    Essential hypertension-stable    Dyslipidemia-tolerating therapy  -     Lipid Panel; Future    IFG (impaired fasting glucose)-repeat  -     Hemoglobin A1C; Future    Need for influenza vaccination  -     Influenza, FLUCELVAX Trivalent, (age 6 mo+) IM, Preservative Free, 0.5mL        No follow-up provider specified.    Mark Gamino MD  10/30/2024    This note was created with the help of speech recognition software (Dragon) and may contain some 'sound alike' errors.

## 2024-10-31 LAB
BASOPHILS # BLD AUTO: 0 X10E3/UL (ref 0–0.2)
BASOPHILS NFR BLD AUTO: 0 %
CHOLEST SERPL-MCNC: 174 MG/DL (ref 100–199)
EOSINOPHIL # BLD AUTO: 0.1 X10E3/UL (ref 0–0.4)
EOSINOPHIL NFR BLD AUTO: 2 %
ERYTHROCYTE [DISTWIDTH] IN BLOOD BY AUTOMATED COUNT: 13 % (ref 11.6–15.4)
HBA1C MFR BLD: 5.8 % (ref 4.8–5.6)
HCT VFR BLD AUTO: 52.2 % (ref 37.5–51)
HDLC SERPL-MCNC: 42 MG/DL
HGB BLD-MCNC: 17.4 G/DL (ref 13–17.7)
IMM GRANULOCYTES # BLD AUTO: 0 X10E3/UL (ref 0–0.1)
IMM GRANULOCYTES NFR BLD AUTO: 1 %
INR PPP: 1 (ref 0.9–1.2)
LDLC SERPL CALC-MCNC: 109 MG/DL (ref 0–99)
LYMPHOCYTES # BLD AUTO: 1.8 X10E3/UL (ref 0.7–3.1)
LYMPHOCYTES NFR BLD AUTO: 35 %
MCH RBC QN AUTO: 33.6 PG (ref 26.6–33)
MCHC RBC AUTO-ENTMCNC: 33.3 G/DL (ref 31.5–35.7)
MCV RBC AUTO: 101 FL (ref 79–97)
MONOCYTES # BLD AUTO: 0.5 X10E3/UL (ref 0.1–0.9)
MONOCYTES NFR BLD AUTO: 9 %
NEUTROPHILS # BLD AUTO: 2.7 X10E3/UL (ref 1.4–7)
NEUTROPHILS NFR BLD AUTO: 53 %
PLATELET # BLD AUTO: 208 X10E3/UL (ref 150–450)
PROTHROMBIN TIME: 11.2 SEC (ref 9.1–12)
RBC # BLD AUTO: 5.18 X10E6/UL (ref 4.14–5.8)
TRIGL SERPL-MCNC: 131 MG/DL (ref 0–149)
VLDLC SERPL CALC-MCNC: 23 MG/DL (ref 5–40)
WBC # BLD AUTO: 5 X10E3/UL (ref 3.4–10.8)

## 2024-11-02 LAB
ALBUMIN SERPL-MCNC: 4.9 G/DL (ref 3.8–4.9)
ALP SERPL-CCNC: 64 IU/L (ref 44–121)
ALT SERPL-CCNC: 70 IU/L (ref 0–44)
AST SERPL-CCNC: 30 IU/L (ref 0–40)
BILIRUB SERPL-MCNC: 0.6 MG/DL (ref 0–1.2)
BUN SERPL-MCNC: 21 MG/DL (ref 6–24)
BUN/CREAT SERPL: 17 (ref 9–20)
CALCIUM SERPL-MCNC: 9.8 MG/DL (ref 8.7–10.2)
CHLORIDE SERPL-SCNC: 103 MMOL/L (ref 96–106)
CO2 SERPL-SCNC: 24 MMOL/L (ref 20–29)
CREAT SERPL-MCNC: 1.26 MG/DL (ref 0.76–1.27)
EGFRCR SERPLBLD CKD-EPI 2021: 67 ML/MIN/1.73
GLOBULIN SER CALC-MCNC: 1.9 G/DL (ref 1.5–4.5)
GLUCOSE SERPL-MCNC: 109 MG/DL (ref 70–99)
IMP & REVIEW OF LAB RESULTS: NORMAL
POTASSIUM SERPL-SCNC: 4.7 MMOL/L (ref 3.5–5.2)
PROT SERPL-MCNC: 6.8 G/DL (ref 6–8.5)
SODIUM SERPL-SCNC: 140 MMOL/L (ref 134–144)

## 2024-11-04 ENCOUNTER — TELEPHONE (OUTPATIENT)
Age: 54
End: 2024-11-04

## 2024-11-04 NOTE — TELEPHONE ENCOUNTER
11/04/2024--I called and spoke with the patient and let him know his lab results and to keep taking the Cholesterol medication. Patient verbalized understanding and is aware to call and make an appointment for a CPE for 6 months.     Also faxed over the lab results to [facility] with pre-op form.

## 2024-11-04 NOTE — TELEPHONE ENCOUNTER
----- Message from Dr. Mark Gamino MD sent at 11/2/2024 11:04 AM EDT -----  Please inform him that cholesterol has really improved.  Continue current therapy.   Forward labs with surgical clearance. Would like him scheduled for a CPE in 6 months.

## 2024-11-24 DIAGNOSIS — F43.29 ADJUSTMENT DISORDER WITH OTHER SYMPTOMS: ICD-10-CM

## 2024-11-24 RX ORDER — ESCITALOPRAM OXALATE 5 MG/1
5 TABLET ORAL DAILY
Qty: 90 TABLET | Refills: 1 | Status: SHIPPED | OUTPATIENT
Start: 2024-11-24

## 2025-02-10 ENCOUNTER — TELEPHONE (OUTPATIENT)
Facility: CLINIC | Age: 55
End: 2025-02-10

## 2025-02-10 RX ORDER — SCOPOLAMINE 1 MG/3D
1 PATCH, EXTENDED RELEASE TRANSDERMAL
Qty: 4 PATCH | Refills: 0 | Status: SHIPPED | OUTPATIENT
Start: 2025-02-10

## 2025-02-10 NOTE — TELEPHONE ENCOUNTER
Patient is going on a cruise (end of March) and would like a prescription for motion sickness..     [facility]/pharmacy #[id number] - [place], [place] - [address] - P [phone] - f [phone]

## 2025-02-10 NOTE — TELEPHONE ENCOUNTER
02/10/2025--This user called and spoke with the patient and he stated that he will be gone on the cruise for 1 week. I let him know that I would let the doctor know and then give him a call back. Patient verbalized understanding and had no further question's at that time.

## 2025-02-10 NOTE — TELEPHONE ENCOUNTER
02/10/2025 at 1:22 pm--This user attempted to call patient but he did not answer, LVM stating that the patches were sent in to pharmacy and he is to change them every 3 days.

## 2025-02-23 DIAGNOSIS — E78.5 DYSLIPIDEMIA: ICD-10-CM

## 2025-02-24 RX ORDER — ATORVASTATIN CALCIUM 20 MG/1
20 TABLET, FILM COATED ORAL DAILY
Qty: 90 TABLET | Refills: 0 | Status: SHIPPED | OUTPATIENT
Start: 2025-02-24
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
## 2025-03-06 ENCOUNTER — OFFICE VISIT (OUTPATIENT)
Facility: CLINIC | Age: 55
End: 2025-03-06
Payer: COMMERCIAL

## 2025-03-06 VITALS
BODY MASS INDEX: 34.22 KG/M2 | TEMPERATURE: 98.1 F | DIASTOLIC BLOOD PRESSURE: 84 MMHG | RESPIRATION RATE: 16 BRPM | WEIGHT: 239 LBS | HEART RATE: 72 BPM | SYSTOLIC BLOOD PRESSURE: 132 MMHG | HEIGHT: 70 IN | OXYGEN SATURATION: 95 %

## 2025-03-06 DIAGNOSIS — R73.01 IFG (IMPAIRED FASTING GLUCOSE): ICD-10-CM

## 2025-03-06 DIAGNOSIS — Z00.00 WELLNESS EXAMINATION: Primary | ICD-10-CM

## 2025-03-06 DIAGNOSIS — Z00.00 WELLNESS EXAMINATION: ICD-10-CM

## 2025-03-06 DIAGNOSIS — F43.29 ADJUSTMENT DISORDER WITH OTHER SYMPTOMS: ICD-10-CM

## 2025-03-06 DIAGNOSIS — I10 ESSENTIAL HYPERTENSION: ICD-10-CM

## 2025-03-06 DIAGNOSIS — E78.5 DYSLIPIDEMIA: ICD-10-CM

## 2025-03-06 DIAGNOSIS — Z96.652 STATUS POST TOTAL LEFT KNEE REPLACEMENT: ICD-10-CM

## 2025-03-06 PROCEDURE — 99396 PREV VISIT EST AGE 40-64: CPT | Performed by: INTERNAL MEDICINE

## 2025-03-06 PROCEDURE — 3075F SYST BP GE 130 - 139MM HG: CPT | Performed by: INTERNAL MEDICINE

## 2025-03-06 PROCEDURE — 3079F DIAST BP 80-89 MM HG: CPT | Performed by: INTERNAL MEDICINE

## 2025-03-06 RX ORDER — SILDENAFIL 100 MG/1
100 TABLET, FILM COATED ORAL DAILY PRN
Qty: 10 TABLET | Refills: 5 | Status: SHIPPED | OUTPATIENT
Start: 2025-03-06

## 2025-03-06 SDOH — ECONOMIC STABILITY: FOOD INSECURITY: WITHIN THE PAST 12 MONTHS, YOU WORRIED THAT YOUR FOOD WOULD RUN OUT BEFORE YOU GOT MONEY TO BUY MORE.: NEVER TRUE

## 2025-03-06 SDOH — ECONOMIC STABILITY: FOOD INSECURITY: WITHIN THE PAST 12 MONTHS, THE FOOD YOU BOUGHT JUST DIDN'T LAST AND YOU DIDN'T HAVE MONEY TO GET MORE.: NEVER TRUE

## 2025-03-06 ASSESSMENT — PATIENT HEALTH QUESTIONNAIRE - PHQ9
SUM OF ALL RESPONSES TO PHQ QUESTIONS 1-9: 0
1. LITTLE INTEREST OR PLEASURE IN DOING THINGS: NOT AT ALL
2. FEELING DOWN, DEPRESSED OR HOPELESS: NOT AT ALL

## 2025-03-06 ASSESSMENT — ENCOUNTER SYMPTOMS
FACIAL SWELLING: 0
DIARRHEA: 0
WHEEZING: 0
SHORTNESS OF BREATH: 0
SORE THROAT: 0
CONSTIPATION: 0
ABDOMINAL PAIN: 0
COLOR CHANGE: 0
EYE ITCHING: 0
BACK PAIN: 0
EYE DISCHARGE: 0
EYE PAIN: 0

## 2025-03-07 LAB
ALBUMIN SERPL-MCNC: 5 G/DL (ref 3.8–4.9)
ALP SERPL-CCNC: 62 IU/L (ref 44–121)
ALT SERPL-CCNC: 38 IU/L (ref 0–44)
AST SERPL-CCNC: 22 IU/L (ref 0–40)
BILIRUB SERPL-MCNC: 0.8 MG/DL (ref 0–1.2)
BUN SERPL-MCNC: 12 MG/DL (ref 6–24)
BUN/CREAT SERPL: 10 (ref 9–20)
CALCIUM SERPL-MCNC: 10.2 MG/DL (ref 8.7–10.2)
CHLORIDE SERPL-SCNC: 102 MMOL/L (ref 96–106)
CHOLEST SERPL-MCNC: 216 MG/DL (ref 100–199)
CO2 SERPL-SCNC: 23 MMOL/L (ref 20–29)
CREAT SERPL-MCNC: 1.2 MG/DL (ref 0.76–1.27)
EGFRCR SERPLBLD CKD-EPI 2021: 71 ML/MIN/1.73
GLOBULIN SER CALC-MCNC: 1.9 G/DL (ref 1.5–4.5)
GLUCOSE SERPL-MCNC: 89 MG/DL (ref 70–99)
HBA1C MFR BLD: 6.3 % (ref 4.8–5.6)
HDLC SERPL-MCNC: 46 MG/DL
IMP & REVIEW OF LAB RESULTS: NORMAL
LDLC SERPL CALC-MCNC: 144 MG/DL (ref 0–99)
POTASSIUM SERPL-SCNC: 4.6 MMOL/L (ref 3.5–5.2)
PROT SERPL-MCNC: 6.9 G/DL (ref 6–8.5)
PSA SERPL-MCNC: 0.5 NG/ML (ref 0–4)
SODIUM SERPL-SCNC: 140 MMOL/L (ref 134–144)
TESTOST SERPL-MCNC: 540 NG/DL (ref 264–916)
TRIGL SERPL-MCNC: 143 MG/DL (ref 0–149)
VLDLC SERPL CALC-MCNC: 26 MG/DL (ref 5–40)

## 2025-04-06 DIAGNOSIS — I10 ESSENTIAL (PRIMARY) HYPERTENSION: ICD-10-CM

## 2025-04-06 DIAGNOSIS — E78.5 DYSLIPIDEMIA: ICD-10-CM

## 2025-04-06 DIAGNOSIS — F43.29 ADJUSTMENT DISORDER WITH OTHER SYMPTOMS: ICD-10-CM

## 2025-04-06 RX ORDER — LISINOPRIL 5 MG/1
5 TABLET ORAL DAILY
Qty: 90 TABLET | Refills: 2 | Status: SHIPPED | OUTPATIENT
Start: 2025-04-06

## 2025-04-06 RX ORDER — ATORVASTATIN CALCIUM 20 MG/1
20 TABLET, FILM COATED ORAL DAILY
Qty: 90 TABLET | Refills: 2 | Status: SHIPPED | OUTPATIENT
Start: 2025-04-06

## 2025-04-06 RX ORDER — ESCITALOPRAM OXALATE 5 MG/1
5 TABLET ORAL DAILY
Qty: 90 TABLET | Refills: 2 | Status: SHIPPED | OUTPATIENT
Start: 2025-04-06

## 2025-04-15 ENCOUNTER — TELEPHONE (OUTPATIENT)
Facility: CLINIC | Age: 55
End: 2025-04-15

## 2025-04-15 NOTE — TELEPHONE ENCOUNTER
----- Message from Dr. Mark Gamino MD sent at 4/14/2025  9:55 PM EDT -----  Regarding: FW: ECC Message to Provider  No, does not need to fast.  ----- Message -----  From: Tootie Reese, BEENA  Sent: 4/14/2025   3:40 PM EDT  To: Mark Gamino MD  Subject: FW: ECC Message to Provider                      Does he need to fast for a preop?  ----- Message -----  From: Salena Gilmore  Sent: 4/14/2025   3:06 PM EDT  To: #  Subject: ECC Message to Provider                          ECC Message to Provider    Relationship to Patient: Self     Additional Information : Patient wants to ask if he needs to fast? , because he will be having an appointment on Friday April 18, 2025 for pre op and some blood work.   --------------------------------------------------------------------------------------------------------------------------    Call Back Information: OK to leave message on voicemail  Preferred Call Back Number: Phone  [phone]

## 2025-04-15 NOTE — TELEPHONE ENCOUNTER
Outgoing call to pt. Name and  verified. = pt made aware that per Dr. Gamino he doesn't need to fast for his preop visit/labs.

## 2025-04-18 ENCOUNTER — OFFICE VISIT (OUTPATIENT)
Facility: CLINIC | Age: 55
End: 2025-04-18
Payer: COMMERCIAL

## 2025-04-18 VITALS
TEMPERATURE: 97.9 F | WEIGHT: 238 LBS | DIASTOLIC BLOOD PRESSURE: 88 MMHG | SYSTOLIC BLOOD PRESSURE: 134 MMHG | BODY MASS INDEX: 34.07 KG/M2 | RESPIRATION RATE: 16 BRPM | OXYGEN SATURATION: 95 % | HEIGHT: 70 IN | HEART RATE: 63 BPM

## 2025-04-18 DIAGNOSIS — Z01.818 PREOP EXAMINATION: Primary | ICD-10-CM

## 2025-04-18 DIAGNOSIS — Z01.818 PREOP EXAMINATION: ICD-10-CM

## 2025-04-18 DIAGNOSIS — M17.11 OSTEOARTHRITIS OF RIGHT KNEE, UNSPECIFIED OSTEOARTHRITIS TYPE: ICD-10-CM

## 2025-04-18 DIAGNOSIS — R73.01 IFG (IMPAIRED FASTING GLUCOSE): ICD-10-CM

## 2025-04-18 DIAGNOSIS — I10 ESSENTIAL (PRIMARY) HYPERTENSION: ICD-10-CM

## 2025-04-18 PROCEDURE — 3075F SYST BP GE 130 - 139MM HG: CPT | Performed by: INTERNAL MEDICINE

## 2025-04-18 PROCEDURE — 99214 OFFICE O/P EST MOD 30 MIN: CPT | Performed by: INTERNAL MEDICINE

## 2025-04-18 PROCEDURE — 3079F DIAST BP 80-89 MM HG: CPT | Performed by: INTERNAL MEDICINE

## 2025-04-18 ASSESSMENT — ENCOUNTER SYMPTOMS
WHEEZING: 0
BACK PAIN: 0
SHORTNESS OF BREATH: 0
ABDOMINAL PAIN: 0
CONSTIPATION: 0
DIARRHEA: 0

## 2025-04-18 NOTE — PROGRESS NOTES
Elder Munoz is a 54 y.o. male who presents today for Pre-op Exam (Right knee replacement 5/1)  .      He has a history of   Patient Active Problem List   Diagnosis    Supraorbital neuralgia    Neck pain    Essential hypertension    Dyslipidemia    IFG (impaired fasting glucose)   .    Today patient is here for preop.     History of Present Illness  The patient is a 54-year-old male who presents for a preoperative evaluation for a total knee arthroplasty on his right knee, scheduled for 05/01/2025 by Dr. Brett Rdz.    He has a history of left knee surgery in 11/2024, which was complication-free. He reports that the left knee surgery was successful and expresses a desire to regain his ability to walk by 07/04/2025. No changes to medications have been made. An EKG is not required today. No latex allergy is reported. Increased pain in the right knee has been managed with a brace throughout the latter half of 2024. A cortisone injection did not provide relief, leading to the use of analgesics. Nausea was experienced post-anesthesia during the previous surgery, but it was managed with medication and subsided upon resumption of regular diet.    A1c levels have been trending up over the last years, with the last A1c at 6.3, up from 5.8 in the fall. The remainder of blood work from 03/2025 was within normal limits. Currently in the prediabetic range, dietary modifications are being sought to lower A1c levels. Weight was lowest in 2021 and 2022 when engaging in regular walking exercises.    PAST SURGICAL HISTORY:  Left knee surgery in 11/2024.        ROS  Review of Systems   Constitutional:  Negative for activity change, appetite change and fever.   Respiratory:  Negative for shortness of breath and wheezing.    Cardiovascular:  Negative for chest pain.   Gastrointestinal:  Negative for abdominal pain, constipation and diarrhea.   Musculoskeletal:  Positive for arthralgias. Negative for back pain and myalgias.

## 2025-04-19 LAB
ANION GAP SERPL CALC-SCNC: 4 MMOL/L (ref 2–12)
BASOPHILS # BLD: 0.03 K/UL (ref 0–0.1)
BASOPHILS NFR BLD: 0.6 % (ref 0–1)
BUN SERPL-MCNC: 13 MG/DL (ref 6–20)
BUN/CREAT SERPL: 11 (ref 12–20)
CALCIUM SERPL-MCNC: 10 MG/DL (ref 8.5–10.1)
CHLORIDE SERPL-SCNC: 103 MMOL/L (ref 97–108)
CO2 SERPL-SCNC: 29 MMOL/L (ref 21–32)
CREAT SERPL-MCNC: 1.18 MG/DL (ref 0.7–1.3)
DIFFERENTIAL METHOD BLD: NORMAL
EOSINOPHIL # BLD: 0.05 K/UL (ref 0–0.4)
EOSINOPHIL NFR BLD: 1 % (ref 0–7)
ERYTHROCYTE [DISTWIDTH] IN BLOOD BY AUTOMATED COUNT: 13.2 % (ref 11.5–14.5)
EST. AVERAGE GLUCOSE BLD GHB EST-MCNC: 111 MG/DL
GLUCOSE SERPL-MCNC: 81 MG/DL (ref 65–100)
HBA1C MFR BLD: 5.5 % (ref 4–5.6)
HCT VFR BLD AUTO: 49.8 % (ref 36.6–50.3)
HGB BLD-MCNC: 16.9 G/DL (ref 12.1–17)
IMM GRANULOCYTES # BLD AUTO: 0.01 K/UL (ref 0–0.04)
IMM GRANULOCYTES NFR BLD AUTO: 0.2 % (ref 0–0.5)
LYMPHOCYTES # BLD: 1.48 K/UL (ref 0.8–3.5)
LYMPHOCYTES NFR BLD: 30.1 % (ref 12–49)
MCH RBC QN AUTO: 32.9 PG (ref 26–34)
MCHC RBC AUTO-ENTMCNC: 33.9 G/DL (ref 30–36.5)
MCV RBC AUTO: 96.9 FL (ref 80–99)
MONOCYTES # BLD: 0.49 K/UL (ref 0–1)
MONOCYTES NFR BLD: 10 % (ref 5–13)
NEUTS SEG # BLD: 2.85 K/UL (ref 1.8–8)
NEUTS SEG NFR BLD: 58.1 % (ref 32–75)
NRBC # BLD: 0 K/UL (ref 0–0.01)
NRBC BLD-RTO: 0 PER 100 WBC
PLATELET # BLD AUTO: 218 K/UL (ref 150–400)
PMV BLD AUTO: 10.6 FL (ref 8.9–12.9)
POTASSIUM SERPL-SCNC: 4.1 MMOL/L (ref 3.5–5.1)
RBC # BLD AUTO: 5.14 M/UL (ref 4.1–5.7)
SODIUM SERPL-SCNC: 136 MMOL/L (ref 136–145)
WBC # BLD AUTO: 4.9 K/UL (ref 4.1–11.1)

## 2025-04-20 ENCOUNTER — RESULTS FOLLOW-UP (OUTPATIENT)
Facility: CLINIC | Age: 55
End: 2025-04-20

## 2025-04-21 NOTE — TELEPHONE ENCOUNTER
----- Message from Dr. Mark Gamino MD sent at 4/20/2025  8:16 PM EDT -----  Please forward with preop form.